# Patient Record
Sex: FEMALE | Race: WHITE | Employment: FULL TIME | URBAN - METROPOLITAN AREA
[De-identification: names, ages, dates, MRNs, and addresses within clinical notes are randomized per-mention and may not be internally consistent; named-entity substitution may affect disease eponyms.]

---

## 2017-05-19 ENCOUNTER — HOSPITAL ENCOUNTER (OUTPATIENT)
Dept: MAMMOGRAPHY | Age: 47
Discharge: HOME | End: 2017-05-19
Attending: INTERNAL MEDICINE
Payer: COMMERCIAL

## 2017-05-19 DIAGNOSIS — Z12.31 SCREENING MAMMOGRAM, ENCOUNTER FOR: ICD-10-CM

## 2017-05-19 PROCEDURE — 77067 SCR MAMMO BI INCL CAD: CPT

## 2017-07-11 ENCOUNTER — OFFICE VISIT (OUTPATIENT)
Dept: INTERNAL MEDICINE CLINIC | Age: 47
End: 2017-07-11

## 2017-07-11 VITALS
HEIGHT: 63 IN | DIASTOLIC BLOOD PRESSURE: 93 MMHG | RESPIRATION RATE: 16 BRPM | WEIGHT: 149 LBS | TEMPERATURE: 98.2 F | SYSTOLIC BLOOD PRESSURE: 146 MMHG | HEART RATE: 101 BPM | OXYGEN SATURATION: 99 % | BODY MASS INDEX: 26.4 KG/M2

## 2017-07-11 DIAGNOSIS — Z00.00 ROUTINE ADULT HEALTH MAINTENANCE: ICD-10-CM

## 2017-07-11 DIAGNOSIS — F42.9 OBSESSIVE-COMPULSIVE DISORDER, UNSPECIFIED TYPE: Chronic | ICD-10-CM

## 2017-07-11 DIAGNOSIS — E11.9 TYPE 2 DIABETES MELLITUS WITHOUT COMPLICATION, WITHOUT LONG-TERM CURRENT USE OF INSULIN (HCC): Primary | Chronic | ICD-10-CM

## 2017-07-11 RX ORDER — GLIMEPIRIDE 4 MG/1
4 TABLET ORAL
COMMUNITY
Start: 2017-05-05 | End: 2017-07-26 | Stop reason: ALTCHOICE

## 2017-07-11 RX ORDER — SERTRALINE HYDROCHLORIDE 100 MG/1
100 TABLET, FILM COATED ORAL DAILY
COMMUNITY
Start: 2017-07-09 | End: 2017-10-06 | Stop reason: SDUPTHER

## 2017-07-11 RX ORDER — METFORMIN HYDROCHLORIDE 1000 MG/1
1000 TABLET ORAL 2 TIMES DAILY
COMMUNITY
Start: 2017-05-05 | End: 2017-09-25 | Stop reason: SDUPTHER

## 2017-07-11 NOTE — PROGRESS NOTES
María Razo is a 52 y.o. female who presents for evaluation of new pt visit. Used to see dr Olu Guallpa, last saw him end of last year. 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 ROS:  Constitutional: negative for fevers, chills, anorexia and weight loss  Eyes:   negative for visual disturbance and irritation  ENT:   negative for tinnitus,sore throat,nasal congestion,ear pain,hoarseness  Respiratory:  negative for cough, hemoptysis, dyspnea,wheezing  CV:   negative for chest pain, palpitations, lower extremity edema  GI:   negative for nausea, vomiting, diarrhea, abdominal pain,melena  Genitourinary: negative for frequency, dysuria and hematuria  Musculoskel: negative for myalgias, arthralgias, back pain, muscle weakness, joint pain  Neurological:  negative for headaches, dizziness, focal weakness, numbness  Psychiatric:     Negative for depression or anxiety      Past Medical History:   Diagnosis Date    Diabetes ([facility] [address]     Menopause 2014    OCD (obsessive compulsive disorder)        Past Surgical History:   Procedure Laterality Date     [address] and 2005    HX CHOLECYSTECTOMY  1995       Family History   Problem Relation Age of Onset    COPD Mother     Hypertension Mother     Cancer Mother     Hypertension Father     Cancer Father     Cancer Sister     Diabetes Maternal Aunt     Diabetes Maternal Uncle        Social History     Social History    Marital status: UNKNOWN     Spouse name: N/A    Number of children: N/A    Years of education: N/A     Occupational History    Not on file.      Social History Main Topics    Smoking status: Former Smoker    Smokeless tobacco: Never Used    Alcohol use Yes      Comment: occassionally    Drug use: No    Sexual activity: Yes     Partners: Male     Other Topics Concern    Not on file     Social History Narrative            Visit Vitals    BP (!) 146/93 (BP 1 Location: Left arm, BP Patient Position: Sitting)    Pulse (!) 101    Temp 98.2 °F (36.8 °C)    Resp 16    Ht 5' 3.25\" (1.607 m)    Wt 149 lb (67.6 kg)    SpO2 99%    BMI 26.19 kg/m2       Physical Examination:   General - Well appearing female  HEENT - PERRL, TM no erythema/opacification, normal nasal turbinates, no oropharyngeal erythema or exudate, MMM  Neck - supple, no bruits, no thyroidomegaly, no lymphadenopathy  Pulm - clear to auscultation bilaterally  Cardio - RRR, normal S1 S2, no murmur  Abd - soft, nontender, no masses, no HSM  Extrem - no edema, +2 distal pulses  Neuro-  No focal deficits, CN intact     Assessment/Plan:    1. Uncontrolled dm, type 2--last a1c 11. Check again with urine micro. On glucophage and amaryl now. Suspect will need insulin (which she did use when pregnant), and will stop amaryl. Consider changing glucophage to xr as well. Check flp. 2.  htn--monitor at home, likely benefit from acei/arb  3.  ocd--continue zoloft  4. Routine adult health--due for pap/pelvic. Check cbc, cmp, flp, tsh. Had eye exam done am [facility] route 1. Also believes had tdap 4 years ago before starting work at [employer].       rtc 3 months        Leela Joseph III, DO

## 2017-07-11 NOTE — PATIENT INSTRUCTIONS

## 2017-07-11 NOTE — MR AVS SNAPSHOT
Visit Information Date & Time Provider Department Dept. Phone Encounter #  
 7/11/2017  3:00 PM Deangelo Castro, [facility] [id number] Follow-up Instructions Return in about 3 months (around 10/11/2017). Upcoming Health Maintenance Date Due DTaP/Tdap/Td series (1 - Tdap) 1/10/1991 PAP AKA CERVICAL CYTOLOGY 1/10/1991 INFLUENZA AGE 9 TO ADULT 8/1/2017 Allergies as of 7/11/2017  Review Complete On: 7/11/2017 By: Stevo Cardona III, DO Severity Noted Reaction Type Reactions Erythromycin  07/11/2017    Other (comments) Severe stomach pains Current Immunizations  Never Reviewed No immunizations on file. Not reviewed this visit You Were Diagnosed With   
  
 Codes Comments Type 2 diabetes mellitus without complication, without long-term current use of insulin (HCC)    -  Primary ICD-10-CM: E11.9 ICD-9-CM: 250.00 Obsessive-compulsive disorder, unspecified type     ICD-10-CM: F42.9 ICD-9-CM: 300.3 Routine adult health maintenance     ICD-10-CM: Z00.00 ICD-9-CM: V70.0 Vitals BP Pulse Temp Resp Height(growth percentile) Weight(growth percentile) (!) 146/93 (BP 1 Location: Left arm, BP Patient Position: Sitting) (!) 101 98.2 °F (36.8 °C) 16 5' 3.25\" (1.607 m) 149 lb (67.6 kg) SpO2 BMI OB Status Smoking Status 99% 26.19 kg/m2 Menopause Former Smoker Vitals History BMI and BSA Data Body Mass Index Body Surface Area  
 26.19 kg/m 2 1.74 m 2 Preferred Pharmacy Pharmacy Name Phone [facility] [place], [place] - [address] [place] [address] [phone] Your Updated Medication List  
  
   
This list is accurate as of: 7/11/17  4:13 PM.  Always use your most recent med list.  
  
  
  
  
 glimepiride 4 mg tablet Commonly known as:  AMARYL Take 4 mg by mouth every morning. metFORMIN 1,000 mg tablet Commonly known as:  GLUCOPHAGE  
 1,000 mg two (2) times a day. Indications: taking once daily  
  
 sertraline 100 mg tablet Commonly known as:  ZOLOFT  
100 mg daily. We Performed the Following CBC WITH AUTOMATED DIFF [98812 CPT(R)] HEMOGLOBIN A1C WITH EAG [31832 CPT(R)] LIPID PANEL [05043 CPT(R)] METABOLIC PANEL, COMPREHENSIVE [42689 CPT(R)] MICROALBUMIN, UR, RAND W/ MICROALBUMIN/CREA RATIO [id number] CPT(R)] TSH 3RD GENERATION [44865 CPT(R)] Follow-up Instructions Return in about 3 months (around 10/11/2017). Patient Instructions Learning About Diabetes Food Guidelines Your Care Instructions Meal planning is important to manage diabetes. It helps keep your blood sugar at a target level (which you set with your doctor). You don't have to eat special foods. You can eat what your family eats, including sweets once in a while. But you do have to pay attention to how often you eat and how much you eat of certain foods. You may want to work with a dietitian or a certified diabetes educator (CDE) to help you plan meals and snacks. A dietitian or CDE can also help you lose weight if that is one of your goals. What should you know about eating carbs? Managing the amount of carbohydrate (carbs) you eat is an important part of healthy meals when you have diabetes. Carbohydrate is found in many foods. · Learn which foods have carbs. And learn the amounts of carbs in different foods. ¨ Bread, cereal, pasta, and rice have about 15 grams of carbs in a serving. A serving is 1 slice of bread (1 ounce), ½ cup of cooked cereal, or 1/3 cup of cooked pasta or rice. ¨ Fruits have 15 grams of carbs in a serving. A serving is 1 small fresh fruit, such as an apple or orange; ½ of a banana; ½ cup of cooked or canned fruit; ½ cup of fruit juice; 1 cup of melon or raspberries; or 2 tablespoons of dried fruit. ¨ Milk and no-sugar-added yogurt have 15 grams of carbs in a serving.  A serving is 1 cup of milk or 2/3 cup of no-sugar-added yogurt. ¨ Starchy vegetables have 15 grams of carbs in a serving. A serving is ½ cup of mashed potatoes or sweet potato; 1 cup winter squash; ½ of a small baked potato; ½ cup of cooked beans; or ½ cup cooked corn or green peas. · Learn how much carbs to eat each day and at each meal. A dietitian or CDE can teach you how to keep track of the amount of carbs you eat. This is called carbohydrate counting. · If you are not sure how to count carbohydrate grams, use the Plate Method to plan meals. It is a good, quick way to make sure that you have a balanced meal. It also helps you spread carbs throughout the day. ¨ Divide your plate by types of foods. Put non-starchy vegetables on half the plate, meat or other protein food on one-quarter of the plate, and a grain or starchy vegetable in the final quarter of the plate. To this you can add a small piece of fruit and 1 cup of milk or yogurt, depending on how many carbs you are supposed to eat at a meal. 
· Try to eat about the same amount of carbs at each meal. Do not \"save up\" your daily allowance of carbs to eat at one meal. 
· Proteins have very little or no carbs per serving. Examples of proteins are beef, chicken, turkey, fish, eggs, tofu, cheese, cottage cheese, and peanut butter. A serving size of meat is 3 ounces, which is about the size of a deck of cards. Examples of meat substitute serving sizes (equal to 1 ounce of meat) are 1/4 cup of cottage cheese, 1 egg, 1 tablespoon of peanut butter, and ½ cup of tofu. How can you eat out and still eat healthy? · Learn to estimate the serving sizes of foods that have carbohydrate. If you measure food at home, it will be easier to estimate the amount in a serving of restaurant food. · If the meal you order has too much carbohydrate (such as potatoes, corn, or baked beans), ask to have a low-carbohydrate food instead. Ask for a salad or green vegetables. · If you use insulin, check your blood sugar before and after eating out to help you plan how much to eat in the future. · If you eat more carbohydrate at a meal than you had planned, take a walk or do other exercise. This will help lower your blood sugar. What else should you know? · Limit saturated fat, such as the fat from meat and dairy products. This is a healthy choice because people who have diabetes are at higher risk of heart disease. So choose lean cuts of meat and nonfat or low-fat dairy products. Use olive or canola oil instead of butter or shortening when cooking. · Don't skip meals. Your blood sugar may drop too low if you skip meals and take insulin or certain medicines for diabetes. · Check with your doctor before you drink alcohol. Alcohol can cause your blood sugar to drop too low. Alcohol can also cause a bad reaction if you take certain diabetes medicines. Follow-up care is a key part of your treatment and safety. Be sure to make and go to all appointments, and call your doctor if you are having problems. It's also a good idea to know your test results and keep a list of the medicines you take. Where can you learn more? Go to http://delon-frances.info/. Enter S375 in the search box to learn more about \"Learning About Diabetes Food Guidelines. \" Current as of: March 13, 2017 Content Version: 11.3 © 4579-1980 Healthwise, Incorporated. Care instructions adapted under license by [employer] (which disclaims liability or warranty for this information). If you have questions about a medical condition or this instruction, always ask your healthcare professional. [place] [address] any warranty or liability for your use of this information. Come back for fasting labs, lab opens 8 am,mon-fri. No appt needed. Make appt with gyn for pap/pelvic. Look into when you last had your tdap booster. Introducing [address]! [facility] introduces [employer] patient portal. Now you can access parts of your medical record, email your doctor's office, and request medication refills online. 1. In your internet browser, go to [email or web address] 2. Click on the First Time User? Click Here link in the Sign In box. You will see the New Member Sign Up page. 3. Enter your [employer] Access Code exactly as it appears below. You will not need to use this code after youve completed the sign-up process. If you do not sign up before the expiration date, you must request a new code. · [employer] Access Code: [email or web address] Expires: 8/11/2017 12:11 PM 
 
4. Enter the last four digits of your Social Security Number (xxxx) and Date of Birth (mm/dd/yyyy) as indicated and click Submit. You will be taken to the next sign-up page. 5. Create a [employer] ID. This will be your [employer] login ID and cannot be changed, so think of one that is secure and easy to remember. 6. Create a [employer] password. You can change your password at any time. 7. Enter your Password Reset Question and Answer. This can be used at a later time if you forget your password. 8. Enter your e-mail address. You will receive e-mail notification when new information is available in [address] 9. Click Sign Up. You can now view and download portions of your medical record. 10. Click the Download Summary menu link to download a portable copy of your medical information. If you have questions, please visit the Frequently Asked Questions section of the [employer] website. Remember, [employer] is NOT to be used for urgent needs. For medical emergencies, dial 911. Now available from your iPhone and Android! Please provide this summary of care documentation to your next provider. Your primary care clinician is listed as Lauren Pete If you have any questions after today's visit, please call [phone].

## 2017-07-11 NOTE — PROGRESS NOTES
Reviewed record in preparation for visit and have obtained necessary documentation. Identified pt with two pt identifiers(name and ). Chief Complaint   Patient presents with   [address] Maintenance Due   Topic Date Due    DTaP/Tdap/Td series (1 - Tdap) 01/10/1991    PAP AKA CERVICAL CYTOLOGY  01/10/1991       Ms. Yusuf Thompson has a reminder for a \"due or due soon\" health maintenance. I have asked that she discuss health maintenance topic(s) due with Her  primary care provider. Coordination of Care Questionnaire:  :     1) Have you been to an emergency room, urgent care clinic since your last visit? no   Hospitalized since your last visit? no             2) Have you seen or consulted any other health care providers outside of [facility] since your last visit? no  (Include any pap smears or colon screenings in this section.)    3) Do you have an Advance Directive on file? no    4) Are you interested in receiving information on Advance Directives? NO    Patient is accompanied by self I have received verbal consent from Katherine Pablo to discuss any/all medical information while they are present in the room.

## 2017-07-21 ENCOUNTER — APPOINTMENT (OUTPATIENT)
Dept: INTERNAL MEDICINE CLINIC | Age: 47
End: 2017-07-21

## 2017-07-22 LAB
ALBUMIN SERPL-MCNC: 4.3 G/DL (ref 3.5–5.5)
ALBUMIN/CREAT UR: 37.4 MG/G CREAT (ref 0–30)
ALBUMIN/GLOB SERPL: 1.8 {RATIO} (ref 1.2–2.2)
ALP SERPL-CCNC: 99 IU/L (ref 39–117)
ALT SERPL-CCNC: 40 IU/L (ref 0–32)
AST SERPL-CCNC: 28 IU/L (ref 0–40)
BASOPHILS # BLD AUTO: 0 X10E3/UL (ref 0–0.2)
BASOPHILS NFR BLD AUTO: 1 %
BILIRUB SERPL-MCNC: 0.3 MG/DL (ref 0–1.2)
BUN SERPL-MCNC: 12 MG/DL (ref 6–24)
BUN/CREAT SERPL: 16 (ref 9–23)
CALCIUM SERPL-MCNC: 9.2 MG/DL (ref 8.7–10.2)
CHLORIDE SERPL-SCNC: 100 MMOL/L (ref 96–106)
CHOLEST SERPL-MCNC: 176 MG/DL (ref 100–199)
CO2 SERPL-SCNC: 24 MMOL/L (ref 18–29)
CREAT SERPL-MCNC: 0.75 MG/DL (ref 0.57–1)
CREAT UR-MCNC: 146.2 MG/DL
EOSINOPHIL # BLD AUTO: 0.1 X10E3/UL (ref 0–0.4)
EOSINOPHIL NFR BLD AUTO: 1 %
ERYTHROCYTE [DISTWIDTH] IN BLOOD BY AUTOMATED COUNT: 13.2 % (ref 12.3–15.4)
EST. AVERAGE GLUCOSE BLD GHB EST-MCNC: 278 MG/DL
GLOBULIN SER CALC-MCNC: 2.4 G/DL (ref 1.5–4.5)
GLUCOSE SERPL-MCNC: 214 MG/DL (ref 65–99)
HBA1C MFR BLD: 11.3 % (ref 4.8–5.6)
HCT VFR BLD AUTO: 39.7 % (ref 34–46.6)
HDLC SERPL-MCNC: 44 MG/DL
HGB BLD-MCNC: 12.8 G/DL (ref 11.1–15.9)
IMM GRANULOCYTES # BLD: 0 X10E3/UL (ref 0–0.1)
IMM GRANULOCYTES NFR BLD: 0 %
LDLC SERPL CALC-MCNC: 107 MG/DL (ref 0–99)
LYMPHOCYTES # BLD AUTO: 2.4 X10E3/UL (ref 0.7–3.1)
LYMPHOCYTES NFR BLD AUTO: 33 %
MCH RBC QN AUTO: 27.1 PG (ref 26.6–33)
MCHC RBC AUTO-ENTMCNC: 32.2 G/DL (ref 31.5–35.7)
MCV RBC AUTO: 84 FL (ref 79–97)
MICROALBUMIN UR-MCNC: 54.7 UG/ML
MONOCYTES # BLD AUTO: 0.3 X10E3/UL (ref 0.1–0.9)
MONOCYTES NFR BLD AUTO: 4 %
NEUTROPHILS # BLD AUTO: 4.4 X10E3/UL (ref 1.4–7)
NEUTROPHILS NFR BLD AUTO: 61 %
PLATELET # BLD AUTO: 229 X10E3/UL (ref 150–379)
POTASSIUM SERPL-SCNC: 4.2 MMOL/L (ref 3.5–5.2)
PROT SERPL-MCNC: 6.7 G/DL (ref 6–8.5)
RBC # BLD AUTO: 4.73 X10E6/UL (ref 3.77–5.28)
SODIUM SERPL-SCNC: 142 MMOL/L (ref 134–144)
TRIGL SERPL-MCNC: 124 MG/DL (ref 0–149)
TSH SERPL DL<=0.005 MIU/L-ACNC: 0.95 UIU/ML (ref 0.45–4.5)
VLDLC SERPL CALC-MCNC: 25 MG/DL (ref 5–40)
WBC # BLD AUTO: 7.2 X10E3/UL (ref 3.4–10.8)

## 2017-07-23 PROBLEM — E78.5 HYPERLIPIDEMIA ASSOCIATED WITH TYPE 2 DIABETES MELLITUS (HCC): Chronic | Status: ACTIVE | Noted: 2017-07-23

## 2017-07-23 PROBLEM — E11.69 HYPERLIPIDEMIA ASSOCIATED WITH TYPE 2 DIABETES MELLITUS (HCC): Chronic | Status: ACTIVE | Noted: 2017-07-23

## 2017-07-23 RX ORDER — ATORVASTATIN CALCIUM 20 MG/1
20 TABLET, FILM COATED ORAL DAILY
Qty: 30 TAB | Refills: 9 | Status: SHIPPED | OUTPATIENT
Start: 2017-07-23 | End: 2018-07-27 | Stop reason: SDUPTHER

## 2017-07-23 RX ORDER — INSULIN DEGLUDEC 200 U/ML
20 INJECTION, SOLUTION SUBCUTANEOUS DAILY
Qty: 4 ADJUSTABLE DOSE PRE-FILLED PEN SYRINGE | Refills: 9 | Status: SHIPPED | OUTPATIENT
Start: 2017-07-23 | End: 2018-02-16 | Stop reason: SDUPTHER

## 2017-07-23 NOTE — PROGRESS NOTES
Needs to make appt to discuss starting insulin. Would start tresiba 20 units each morning with breakfast.  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 Would stop amaryl, continue with glucophage. Cholesterol levels also too high for pt with diabetes. rx sent in for lipitor.

## 2017-07-24 ENCOUNTER — PATIENT OUTREACH (OUTPATIENT)
Dept: INTERNAL MEDICINE CLINIC | Age: 47
End: 2017-07-24

## 2017-07-24 ENCOUNTER — TELEPHONE (OUTPATIENT)
Dept: INTERNAL MEDICINE CLINIC | Age: 47
End: 2017-07-24

## 2017-07-24 NOTE — PROGRESS NOTES
Call completed to patient, two identifiers verified. Lab results and recommendations reviewed. Patient verbalized an understanding and agrees with recommendations for starting medication and insulin. Patient offered and declined an office visit. 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 She is requesting a phone call from Abby Woods Diabetic Educator to discuss titration is possible. The patient lives 40 minutes away and reports using insulin before during pregnancy. Patient advised request would be submitted to DO for review and approval. Verbal with read back orders received for insulin titration. Per Dr. Prosper Garcia Patient is to start using 20 units of insulin degludec (TRESIBA FLEXTOUCH U-200) 200 unit/mL (3 mL) inpn and increase the dosage every Tuesday (IF she starts on Tuesday) by 4 units until her fasting blood glucose is 150 or less.        Was asked to see pt for T2DM. See result below. Lab Results   Component Value Date/Time    Hemoglobin A1c 11.3 07/21/2017 08:34 AM     Pt stated she picked up the insulin and Lipitor. Pt will stop Amaryl and continue metformin. Pt will take insulin in the morning, and check blood sugar fasting and record. Pt will bring log book to her appt on 7/26/17 at 3 PM.     Future Appointments:  Future Appointments  Date Time Provider Jose M Ivan   7/26/2017 3:00 PM Fox Lentz        Last Appointment My Department:  7/11/2017    This note will not be viewable in [address]

## 2017-07-24 NOTE — TELEPHONE ENCOUNTER
----- Message from Bryson Rodriguez III DO sent at 7/23/2017  1:13 PM EDT -----  Needs to make appt to discuss starting insulin. Would start tresiba 20 units each morning with breakfast.  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 Would stop amaryl, continue with glucophage. Cholesterol levels also too high for pt with diabetes. rx sent in for lipitor.

## 2017-07-24 NOTE — PROGRESS NOTES
Call completed to patient, two identifiers verified. Lab results and recommendations reviewed. Patient verbalized an understanding and agrees with recommendations for starting medication and insulin. Patient offered and declined an office visit. 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 She is requesting a phone call from Gulshan Quinteros Diabetic Educator to discuss titration is possible. The patient lives 40 minutes away and reports using insulin before during pregnancy.  Patient advised request would be submitted to DO for review and approval.

## 2017-07-24 NOTE — TELEPHONE ENCOUNTER
Call completed to patient, two identifiers verified. Lab results and recommendations reviewed. Patient verbalized an understanding and agrees with recommendations for starting medication and insulin. Patient offered and declined an office visit. 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 She is requesting a phone call from Marla Cueto Diabetic Educator to discuss titration is possible. The patient lives [place] minutes away and reports using insulin before during pregnancy. Patient advised request would be submitted to DO for review and approval. Verbal with read back orders received for insulin titration. Per Dr. Jean Pierre Cuevas Patient is to start using 20 units of insulin degludec (TRESIBA FLEXTOUCH U-200) 200 unit/mL (3 mL) inpn and increase the dosage every Tuesday (IF she starts on Tuesday) by 4 units until her fasting blood glucose is 150 or less.

## 2017-07-26 ENCOUNTER — OFFICE VISIT (OUTPATIENT)
Dept: INTERNAL MEDICINE CLINIC | Age: 47
End: 2017-07-26

## 2017-07-26 ENCOUNTER — PATIENT OUTREACH (OUTPATIENT)
Dept: INTERNAL MEDICINE CLINIC | Age: 47
End: 2017-07-26

## 2017-07-26 DIAGNOSIS — E11.9 TYPE 2 DIABETES MELLITUS WITHOUT COMPLICATION, WITHOUT LONG-TERM CURRENT USE OF INSULIN (HCC): Primary | Chronic | ICD-10-CM

## 2017-07-26 NOTE — PROGRESS NOTES
CC:  Diabetes management    S/O: Dina Peck is a 52 y.o. female referred by Dr. Sanjana Avila for diabetes management. Pt just started insulin today, but took it 12 years ago when she was pregnant. Pt has been taking oral medications only. Current diabetes regimen consists of the following: Tresiba 20 units daily with breakfast, increase 4 units every Tuesday until fasting blood sugar is 150 or less. Pt injects in upper outer quadrant of thigh; metformin 1000 mg twice daily with meals; Lipitor 20 mg every evening. Patient denies the following signs/symptoms of diabetes: polyuria, polydipsia, polyphagia, blurred vision, numbness/tingling in hands/feet. Patient denies recent hypoglycemic episodes. Patient is motivated 5/10 to control diabetes. It is not a 3 or 4 because pt stated she needs to get control    Patient checks blood sugars one time per day and readings run 220mg/dL. That was this morning. Pt has not been checking her blood sugars on a regular basis. Most recent A1C was 11.3%. A year ago a1c was ~12 %. Lab Results   Component Value Date/Time    Hemoglobin A1c 11.3 07/21/2017 08:34 AM     Pt loves sweets. Pt eats tossed salads with cucumber and tomato and green peppers, but does not like any other veggies. Pt cooks for a family and needs to prepare meals that the whole family can eat. Pt does not have a portion control problem. Her BMI is 26. 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   Patient denies adverse effects from their medications. Past Medical History:   Diagnosis Date    Diabetes ([address]     Menopause     OCD (obsessive compulsive disorder)      Past Surgical History:   Procedure Laterality Date    HX  SECTION       and     HX CHOLECYSTECTOMY       Family History   Problem Relation Age of Onset   [place] COPD Mother     Hypertension Mother     Cancer Mother     Hypertension Father     Cancer Father     Cancer Sister     Diabetes Maternal Aunt     Diabetes Maternal Uncle      Social History     Social History    Marital status:      Spouse name: N/A    Number of children: N/A    Years of education: N/A     Social History Main Topics    Smoking status: Former Smoker    Smokeless tobacco: Never Used    Alcohol use Yes      Comment: occassionally    Drug use: No    Sexual activity: Yes     Partners: Male     Other Topics Concern    Not on file     Social History Narrative     Allergies   Allergen Reactions    Erythromycin Other (comments)     Severe stomach pains     Current Outpatient Prescriptions   Medication Sig    atorvastatin (LIPITOR) 20 mg tablet Take 1 Tab by mouth daily.  insulin degludec (TRESIBA FLEXTOUCH U-200) 200 unit/mL (3 mL) inpn 20 Units by SubCUTAneous route daily.  metFORMIN (GLUCOPHAGE) 1,000 mg tablet 1,000 mg two (2) times a day. Indications: taking once daily    sertraline (ZOLOFT) 100 mg tablet 100 mg daily. No current facility-administered medications for this visit. There were no vitals taken for this visit.     Data reviewed:  Lab Results   Component Value Date/Time    Hemoglobin A1c 11.3 2017 08:34 AM     Lab Results   Component Value Date/Time    Cholesterol, total 176 2017 08:34 AM    HDL Cholesterol 44 2017 08:34 AM    LDL, calculated 107 2017 08:34 AM    VLDL, calculated 25 2017 08:34 AM    Triglyceride 124 2017 08:34 AM     Lab Results   Component Value Date/Time    ALT (SGPT) 40 2017 08:34 AM AST (SGOT) 28 07/21/2017 08:34 AM    Alk. phosphatase 99 07/21/2017 08:34 AM    Bilirubin, total 0.3 07/21/2017 08:34 AM     Lab Results   Component Value Date/Time    Creatinine 0.75 07/21/2017 08:34 AM     Lab Results   Component Value Date/Time    Microalb/Creat ratio (ug/mg creat.) 37.4 07/21/2017 08:34 AM       Screenings:  Last eye exam was - pt will schedule  Last foot exam was - next appt    Assessment/Plan:     1. Diabetes:  Controlled/Uncontrolled (goal A1C at least <7%/<8% due to older age and increased risk of hypoglycemia). Blood glucose readings: are high      My Chart glucose flow sheet ordered today for pt. Pt sent My Chart message with website for My Fitness Pal      A. Medications:  Stressed importance of medication adherence on overall diabetes control as well as with preventing complications of diabetes. Reviewed symptoms of hypoglycemia and how to treat. Instructed patient to continue to monitor blood sugars every morning fasting and call the office if has a low blood sugar. B. Diet/lifestyle:  Reinforced importance of regular activity/exercise to help improve insulin resistance and reviewed food labels/carbohydrates/general carb guidelines for meals (30-45) and snacks (goal of 15g). Patient education materials were provided and reviewed with the patient. Daily Diabetes Meal Planning Guide by [employer]  Living Well With Diabetes  Glucose Log Book   Get Smart about counting Carbs by [facility]  Diabetes Health Monitor [place]  Diabetes Health Monitor Special Supplement  Web MD Diabetes [place]    1..  Hypertension:  Blood pressure is/is not at goal of < 140/90 mm Hg per the ADA guidelines. Emphasized the importance of regular physical activity, restricting salt in diet and weight maintenance/loss on overall blood pressure control. 2..  Hyperlipidemia:  LDL currently is/is  <100 mg/dL.   Current lipid treatment guidelines recommend at least moderate-intensity statin doses to decrease ASCVD risk. 3.  Medication reconciliation was completed during the visit. Medications Discontinued During This Encounter   Medication Reason    glimepiride (AMARYL) 4 mg tablet Therapy Completed       Patient verbalized understanding of the information presented and all of the patients questions were answered. AVS was handed to the patient and information reviewed. Patient advised to call the office with any additional questions or concerns. Follow-up: 10/4/17. Notification of recommendations will be sent to Dr. Ziyad Izaguirre DO for review. Thank you for the consult,  Audrey Clemens RN    Note routed to Dr. Claudeen Hung.

## 2017-07-26 NOTE — PATIENT INSTRUCTIONS
Goals:    1) incorporate whole wheat and grains into family meals starting next week    2) sweets- choose sugar free alternatives starting now

## 2017-07-26 NOTE — PROGRESS NOTES
CC:  Diabetes management    S/O: Lia Narayanan is a 52 y.o. female referred by Dr. Franchesca Ford for diabetes management. Pt just started insulin today, but took it 12 years ago when she was pregnant. Pt has been taking oral medications only. Current diabetes regimen consists of the following: Tresiba 20 units daily with breakfast, increase 4 units every Tuesday until fasting blood sugar is 150 or less. Pt injects in upper outer quadrant of thigh; metformin 1000 mg twice daily with meals; Lipitor 20 mg every evening. Patient denies the following signs/symptoms of diabetes: polyuria, polydipsia, polyphagia, blurred vision, numbness/tingling in hands/feet. Patient denies recent hypoglycemic episodes. Patient is motivated 5/10 to control diabetes. It is not a 3 or 4 because pt stated she needs to get control    Patient checks blood sugars one time per day and readings run 220mg/dL. That was this morning. Pt has not been checking her blood sugars on a regular basis. Most recent A1C was 11.3%. A year ago a1c was ~12 %. Lab Results   Component Value Date/Time    Hemoglobin A1c 11.3 07/21/2017 08:34 AM     Pt loves sweets. Pt eats tossed salads with cucumber and tomato and green peppers, but does not like any other veggies. Pt cooks for a family and needs to prepare meals that the whole family can eat. Pt does not have a portion control problem. Her BMI is 26. 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   Patient denies adverse effects from their medications. Past Medical History:   Diagnosis Date    Diabetes ([address]     Menopause     OCD (obsessive compulsive disorder)      Past Surgical History:   Procedure Laterality Date    HX  SECTION       and     HX CHOLECYSTECTOMY       Family History   Problem Relation Age of Onset   [employer] COPD Mother     Hypertension Mother     Cancer Mother     Hypertension Father     Cancer Father     Cancer Sister     Diabetes Maternal Aunt     Diabetes Maternal Uncle      Social History     Social History    Marital status:      Spouse name: N/A    Number of children: N/A    Years of education: N/A     Social History Main Topics    Smoking status: Former Smoker    Smokeless tobacco: Never Used    Alcohol use Yes      Comment: occassionally    Drug use: No    Sexual activity: Yes     Partners: Male     Other Topics Concern    Not on file     Social History Narrative     Allergies   Allergen Reactions    Erythromycin Other (comments)     Severe stomach pains     Current Outpatient Prescriptions   Medication Sig    atorvastatin (LIPITOR) 20 mg tablet Take 1 Tab by mouth daily.  insulin degludec (TRESIBA FLEXTOUCH U-200) 200 unit/mL (3 mL) inpn 20 Units by SubCUTAneous route daily.  metFORMIN (GLUCOPHAGE) 1,000 mg tablet 1,000 mg two (2) times a day. Indications: taking once daily    sertraline (ZOLOFT) 100 mg tablet 100 mg daily. No current facility-administered medications for this visit. There were no vitals taken for this visit.     Data reviewed:  Lab Results   Component Value Date/Time    Hemoglobin A1c 11.3 2017 08:34 AM     Lab Results   Component Value Date/Time    Cholesterol, total 176 2017 08:34 AM    HDL Cholesterol 44 2017 08:34 AM    LDL, calculated 107 2017 08:34 AM    VLDL, calculated 25 2017 08:34 AM    Triglyceride 124 2017 08:34 AM     Lab Results   Component Value Date/Time    ALT (SGPT) 40 2017 08:34 AM AST (SGOT) 28 07/21/2017 08:34 AM    Alk. phosphatase 99 07/21/2017 08:34 AM    Bilirubin, total 0.3 07/21/2017 08:34 AM     Lab Results   Component Value Date/Time    Creatinine 0.75 07/21/2017 08:34 AM     Lab Results   Component Value Date/Time    Microalb/Creat ratio (ug/mg creat.) 37.4 07/21/2017 08:34 AM       Screenings:  Last eye exam was - pt will schedule  Last foot exam was - next appt    Assessment/Plan:     1. Diabetes:  Controlled/Uncontrolled (goal A1C at least <7%/<8% due to older age and increased risk of hypoglycemia). Blood glucose readings: are high      My Chart glucose flow sheet ordered today for pt. Pt sent My Chart message with website for My Fitness Pal      A. Medications:  Stressed importance of medication adherence on overall diabetes control as well as with preventing complications of diabetes. Reviewed symptoms of hypoglycemia and how to treat. Instructed patient to continue to monitor blood sugars every morning fasting and call the office if has a low blood sugar. B. Diet/lifestyle:  Reinforced importance of regular activity/exercise to help improve insulin resistance and reviewed food labels/carbohydrates/general carb guidelines for meals (30-45) and snacks (goal of 15g). Patient education materials were provided and reviewed with the patient. Daily Diabetes Meal Planning Guide by [employer]  Living Well With Diabetes  Glucose Log Book   Get Smart about counting Carbs by [facility]  Diabetes Health Monitor [place]  Diabetes Health Monitor Special Supplement  Web MD Diabetes [place]    1..  Hypertension:  Blood pressure is/is not at goal of < 140/90 mm Hg per the ADA guidelines. Emphasized the importance of regular physical activity, restricting salt in diet and weight maintenance/loss on overall blood pressure control. 2..  Hyperlipidemia:  LDL currently is/is  <100 mg/dL.   Current lipid treatment guidelines recommend at least moderate-intensity statin doses to decrease ASCVD risk. 3.  Medication reconciliation was completed during the visit. Medications Discontinued During This Encounter   Medication Reason    glimepiride (AMARYL) 4 mg tablet Therapy Completed     Goals:    1) incorporate whole wheat and grains into family meals starting next week    2) sweets- choose sugar free alternatives starting now    Patient verbalized understanding of the information presented and all of the patients questions were answered. AVS was handed to the patient and information reviewed. Patient advised to call the office with any additional questions or concerns. Follow-up: 10/4/17. Notification of recommendations will be sent to Dr. Umesh Keys DO for review. Thank you for the consult,  Ofelia Morales RN    Note routed to Dr. Sanjana Avila. This note will not be viewable in [address]

## 2017-09-25 ENCOUNTER — TELEPHONE (OUTPATIENT)
Dept: INTERNAL MEDICINE CLINIC | Age: 47
End: 2017-09-25

## 2017-09-25 RX ORDER — METFORMIN HYDROCHLORIDE 1000 MG/1
1000 TABLET ORAL 2 TIMES DAILY
Qty: 180 TAB | Refills: 3 | Status: SHIPPED | OUTPATIENT
Start: 2017-09-25 | End: 2018-10-12 | Stop reason: SDUPTHER

## 2017-10-06 RX ORDER — SERTRALINE HYDROCHLORIDE 100 MG/1
100 TABLET, FILM COATED ORAL DAILY
Qty: 90 TAB | Refills: 3 | Status: SHIPPED | OUTPATIENT
Start: 2017-10-06 | End: 2018-10-08 | Stop reason: SDUPTHER

## 2017-10-06 NOTE — TELEPHONE ENCOUNTER
Please follow up with this patient at [phone] (home) [phone] (work) when this has been completed. Thanks!

## 2017-10-30 ENCOUNTER — TELEPHONE (OUTPATIENT)
Dept: INTERNAL MEDICINE CLINIC | Age: 47
End: 2017-10-30

## 2017-10-30 NOTE — TELEPHONE ENCOUNTER
#[phone] pt needs to know if you have samples of her insulin, Alicia Burgess? Please call pt as soon as possible as she is almost out of insulin.

## 2017-10-30 NOTE — TELEPHONE ENCOUNTER
Spoke with patient after 2 patient identifiers being note and advised that we do not have any samples at this time. Patient expressed understanding and has no further questions at this time.

## 2017-11-17 ENCOUNTER — PATIENT OUTREACH (OUTPATIENT)
Dept: INTERNAL MEDICINE CLINIC | Age: 47
End: 2017-11-17

## 2017-11-17 NOTE — PROGRESS NOTES
Patient identified on Diabetic Bundle Registry for A1c >9%. Left voice message for Patient asking for a return call regarding scheduling an appointment with the Interprofessional Diabetes Team. This educator has also met with pt in the past. Pt was due for f/u with Dr. Casper Deng last month. Get In Touch letter mailed. Lab Results   Component Value Date/Time    Hemoglobin A1c 11.3 07/21/2017 08:34 AM     Future Appointments:  No future appointments.      Last Appointment My Department:  7/26/2017

## 2018-02-16 ENCOUNTER — TELEPHONE (OUTPATIENT)
Dept: INTERNAL MEDICINE CLINIC | Age: 48
End: 2018-02-16

## 2018-02-16 DIAGNOSIS — E11.9 TYPE 2 DIABETES MELLITUS WITHOUT COMPLICATION, WITHOUT LONG-TERM CURRENT USE OF INSULIN (HCC): Primary | Chronic | ICD-10-CM

## 2018-02-16 RX ORDER — INSULIN DEGLUDEC 200 U/ML
20 INJECTION, SOLUTION SUBCUTANEOUS DAILY
Qty: 1 PEN | Refills: 0 | Status: SHIPPED | COMMUNITY
Start: 2018-02-16 | End: 2018-02-16 | Stop reason: DRUGHIGH

## 2018-02-16 RX ORDER — INSULIN DEGLUDEC 100 U/ML
INJECTION, SOLUTION SUBCUTANEOUS
Qty: 1 PEN | Refills: 0 | Status: SHIPPED | COMMUNITY
Start: 2018-02-16 | End: 2018-07-05 | Stop reason: SDUPTHER

## 2018-02-16 NOTE — TELEPHONE ENCOUNTER
Spoke with patient after 2 patient identifiers being note and advised that medication is in office ready for . Patient expressed understanding and has no further questions at this time.

## 2018-02-16 NOTE — TELEPHONE ENCOUNTER
Patient's daughter, [facility] (Rosalie Alvarez confirmed), presented to the office to  insulin sample. Verified medication & noted we only have Tresiba U-100 on hand. U-200 is what patient has at home & what is ordered by Dr. Jason Carreon. I confirmed with another physician on behalf of Dr. Jason Carreon if her dosing would need to change based on concentration difference. Per Dr. Romero Torres dosing may stay the same with this medication. Daughter & patient advised of this & verbalize understanding. Medication list updated to reflect accurate sample dispensed.

## 2018-02-20 ENCOUNTER — OFFICE VISIT (OUTPATIENT)
Dept: INTERNAL MEDICINE CLINIC | Age: 48
End: 2018-02-20

## 2018-02-20 VITALS
RESPIRATION RATE: 16 BRPM | HEART RATE: 94 BPM | OXYGEN SATURATION: 99 % | TEMPERATURE: 98.1 F | DIASTOLIC BLOOD PRESSURE: 89 MMHG | WEIGHT: 150 LBS | BODY MASS INDEX: 26.58 KG/M2 | HEIGHT: 63 IN | SYSTOLIC BLOOD PRESSURE: 134 MMHG

## 2018-02-20 DIAGNOSIS — E11.9 TYPE 2 DIABETES MELLITUS WITHOUT COMPLICATION, WITHOUT LONG-TERM CURRENT USE OF INSULIN (HCC): Primary | Chronic | ICD-10-CM

## 2018-02-20 DIAGNOSIS — E11.21 TYPE 2 DIABETES WITH NEPHROPATHY (HCC): Chronic | ICD-10-CM

## 2018-02-20 DIAGNOSIS — E11.69 HYPERLIPIDEMIA ASSOCIATED WITH TYPE 2 DIABETES MELLITUS (HCC): Chronic | ICD-10-CM

## 2018-02-20 DIAGNOSIS — F42.9 OBSESSIVE-COMPULSIVE DISORDER, UNSPECIFIED TYPE: Chronic | ICD-10-CM

## 2018-02-20 DIAGNOSIS — E78.5 HYPERLIPIDEMIA ASSOCIATED WITH TYPE 2 DIABETES MELLITUS (HCC): Chronic | ICD-10-CM

## 2018-02-20 LAB — HBA1C MFR BLD HPLC: 10.7 %

## 2018-02-20 NOTE — PATIENT INSTRUCTIONS
Diabetes Foot Health: Care Instructions  Your Care Instructions    When you have diabetes, your feet need extra care and attention. Diabetes can damage the nerve endings and blood vessels in your feet, making you less likely to notice when your feet are injured. Diabetes also limits your body's ability to fight infection and get blood to areas that need it. If you get a minor foot injury, it could become an ulcer or a serious infection. With good foot care, you can prevent most of these problems. Caring for your feet can be quick and easy. Most of the care can be done when you are bathing or getting ready for bed. Follow-up care is a key part of your treatment and safety. Be sure to make and go to all appointments, and call your doctor if you are having problems. It's also a good idea to know your test results and keep a list of the medicines you take. How can you care for yourself at home? · Keep your blood sugar close to normal by watching what and how much you eat, monitoring blood sugar, taking medicines if prescribed, and getting regular exercise. · Do not smoke. Smoking affects blood flow and can make foot problems worse. If you need help quitting, talk to your doctor about stop-smoking programs and medicines. These can increase your chances of quitting for good. · Eat a diet that is low in fats. High fat intake can cause fat to build up in your blood vessels and decrease blood flow. · Inspect your feet daily for blisters, cuts, cracks, or sores. If you cannot see well, use a mirror or have someone help you. · Take care of your feet:  [facility] your feet every day. Use warm (not hot) water. Check the water temperature with your wrists or other part of your body, not your feet. ¨ Dry your feet well. Pat them dry. Do not rub the skin on your feet too hard. Dry well between your toes. If the skin on your feet stays moist, bacteria or a fungus can grow, which can lead to infection.   ¨ Keep your skin soft. Use moisturizing skin cream to keep the skin on your feet soft and prevent calluses and cracks. But do not put the cream between your toes, and stop using any cream that causes a rash. ¨ Clean underneath your toenails carefully. Do not use a sharp object to clean underneath your toenails. Use the blunt end of a nail file or other rounded tool. ¨ Trim and file your toenails straight across to prevent ingrown toenails. Use a nail clipper, not scissors. Use an emery board to smooth the edges. · Change socks daily. Socks without seams are best, because seams often rub the feet. You can find socks for people with diabetes from specialty catalogs. · Look inside your shoes every day for things like gravel or torn linings, which could cause blisters or sores. · Buy shoes that fit well:  ¨ Look for shoes that have plenty of space around the toes. This helps prevent bunions and blisters. ¨ Try on shoes while wearing the kind of socks you will usually wear with the shoes. ¨ Avoid plastic shoes. They may rub your feet and cause blisters. Good shoes should be made of materials that are flexible and breathable, such as leather or cloth. ¨ Break in new shoes slowly by wearing them for no more than an hour a day for several days. Take extra time to check your feet for red areas, blisters, or other problems after you wear new shoes. · Do not go barefoot. Do not wear sandals, and do not wear shoes with very thin soles. Thin soles are easy to puncture. They also do not protect your feet from hot pavement or cold weather. · Have your doctor check your feet during each visit. If you have a foot problem, see your doctor. Do not try to treat an early foot problem at home. Home remedies or treatments that you can buy without a prescription (such as corn removers) can be harmful. · Always get early treatment for foot problems. A minor irritation can lead to a major problem if not properly cared for early.   When should you call for help? Call your doctor now or seek immediate medical care if:  ? · You have a foot sore, an ulcer or break in the skin that is not healing after 4 days, bleeding corns or calluses, or an ingrown toenail. ? · You have blue or black areas, which can mean bruising or blood flow problems. ? · You have peeling skin or tiny blisters between your toes or cracking or oozing of the skin. ? · You have a fever for more than 24 hours and a foot sore. ? · You have new numbness or tingling in your feet that does not go away after you move your feet or change positions. ? · You have unexplained or unusual swelling of the foot or ankle. ? Watch closely for changes in your health, and be sure to contact your doctor if:  ? · You cannot do proper foot care. Where can you learn more? Go to http://delon-frances.info/. Enter A739 in the search box to learn more about \"Diabetes Foot Health: Care Instructions. \"  Current as of: March 13, 2017  Content Version: 11.4  © 2635-9032 [employer]. Care instructions adapted under license by [employer] (which disclaims liability or warranty for this information). If you have questions about a medical condition or this instruction, always ask your healthcare professional. [address] any warranty or liability for your use of this information. After your next eye exam, have them send us a copy of your report.

## 2018-02-20 NOTE — MR AVS SNAPSHOT
[address] [facility] [address] [facility] 
[phone] Patient: Edith Bee MRN: [id number] Visit Information Date & Time Provider Department Dept. Phone Encounter #  
 2/20/2018 11:45 AM Otilia Farnsworth, [address] [id number] Follow-up Instructions Return in about 6 months (around 8/20/2018). Upcoming Health Maintenance Date Due  
 FOOT EXAM Q1 1/10/1980 EYE EXAM RETINAL OR DILATED Q1 1/10/1980 Pneumococcal 19-64 Medium Risk (1 of 1 - PPSV23) 1/10/1989 PAP AKA CERVICAL CYTOLOGY 1/10/1991 DTaP/Tdap/Td series (1 - Tdap) 3/11/2011 HEMOGLOBIN A1C Q6M 1/21/2018 MICROALBUMIN Q1 7/21/2018 LIPID PANEL Q1 7/21/2018 Allergies as of 2/20/2018  Review Complete On: 2/20/2018 By: Jaison Gonzalez III, DO Severity Noted Reaction Type Reactions Erythromycin  07/11/2017    Other (comments) Severe stomach pains Current Immunizations  Never Reviewed Name Date Td 3/10/2011 Not reviewed this visit You Were Diagnosed With   
  
 Codes Comments Type 2 diabetes mellitus without complication, without long-term current use of insulin (HCC)    -  Primary ICD-10-CM: E11.9 ICD-9-CM: 250.00 Hyperlipidemia associated with type 2 diabetes mellitus ([facility] [address]     ICD-10-CM: E11.69, E78.5 ICD-9-CM: 250.80, 272.4 Obsessive-compulsive disorder, unspecified type     ICD-10-CM: F42.9 ICD-9-CM: 300.3 Type 2 diabetes with nephropathy (HCC)     ICD-10-CM: E11.21 
ICD-9-CM: 250.40, 583.81 Vitals BP Pulse Temp Resp Height(growth percentile) Weight(growth percentile) 134/89 (BP 1 Location: Left arm, BP Patient Position: Sitting) 94 98.1 °F (36.7 °C) (Oral) 16 5' 3.25\" (1.607 m) 150 lb (68 kg) SpO2 BMI OB Status Smoking Status 99% 26.36 kg/m2 Menopause Former Smoker Vitals History BMI and BSA Data Body Mass Index Body Surface Area  
 26.36 kg/m 2 1.74 m 2 Preferred Pharmacy Pharmacy Name Phone [phone] Your Updated Medication List  
  
   
This list is accurate as of: 2/20/18 12:38 PM.  Always use your most recent med list.  
  
  
  
  
 atorvastatin 20 mg tablet Commonly known as:  LIPITOR Take 1 Tab by mouth daily. insulin degludec 100 unit/mL (3 mL) Inpn Commonly known as:  TRESIBA FLEXTOUCH U-100  
20 Units by SubCUTAneous route daily. metFORMIN 1,000 mg tablet Commonly known as:  GLUCOPHAGE Take 1 Tab by mouth two (2) times a day. Indications: taking once daily  
  
 sertraline 100 mg tablet Commonly known as:  ZOLOFT Take 1 Tab by mouth daily. We Performed the Following AMB POC HEMOGLOBIN A1C [95092 CPT(R)]  DIABETES FOOT EXAM [HM7 Custom] Follow-up Instructions Return in about 6 months (around 8/20/2018). Patient Instructions Diabetes Foot Health: Care Instructions Your Care Instructions When you have diabetes, your feet need extra care and attention. Diabetes can damage the nerve endings and blood vessels in your feet, making you less likely to notice when your feet are injured. Diabetes also limits your body's ability to fight infection and get blood to areas that need it. If you get a minor foot injury, it could become an ulcer or a serious infection. With good foot care, you can prevent most of these problems. Caring for your feet can be quick and easy. Most of the care can be done when you are bathing or getting ready for bed. Follow-up care is a key part of your treatment and safety. Be sure to make and go to all appointments, and call your doctor if you are having problems. It's also a good idea to know your test results and keep a list of the medicines you take. How can you care for yourself at home? · Keep your blood sugar close to normal by watching what and how much you eat, monitoring blood sugar, taking medicines if prescribed, and getting regular exercise. · Do not smoke. Smoking affects blood flow and can make foot problems worse. If you need help quitting, talk to your doctor about stop-smoking programs and medicines. These can increase your chances of quitting for good. · Eat a diet that is low in fats. High fat intake can cause fat to build up in your blood vessels and decrease blood flow. · Inspect your feet daily for blisters, cuts, cracks, or sores. If you cannot see well, use a mirror or have someone help you. · Take care of your feet: 
[facility] AUTHORITY your feet every day. Use warm (not hot) water. Check the water temperature with your wrists or other part of your body, not your feet. ¨ Dry your feet well. Pat them dry. Do not rub the skin on your feet too hard. Dry well between your toes. If the skin on your feet stays moist, bacteria or a fungus can grow, which can lead to infection. ¨ Keep your skin soft. Use moisturizing skin cream to keep the skin on your feet soft and prevent calluses and cracks. But do not put the cream between your toes, and stop using any cream that causes a rash. ¨ Clean underneath your toenails carefully. Do not use a sharp object to clean underneath your toenails. Use the blunt end of a nail file or other rounded tool. ¨ Trim and file your toenails straight across to prevent ingrown toenails. Use a nail clipper, not scissors. Use an emery board to smooth the edges. · Change socks daily. Socks without seams are best, because seams often rub the feet. You can find socks for people with diabetes from specialty catalogs. · Look inside your shoes every day for things like gravel or torn linings, which could cause blisters or sores. · Buy shoes that fit well: 
¨ Look for shoes that have plenty of space around the toes. This helps prevent bunions and blisters. ¨ Try on shoes while wearing the kind of socks you will usually wear with the shoes. ¨ Avoid plastic shoes. They may rub your feet and cause blisters. Good shoes should be made of materials that are flexible and breathable, such as leather or cloth. ¨ Break in new shoes slowly by wearing them for no more than an hour a day for several days. Take extra time to check your feet for red areas, blisters, or other problems after you wear new shoes. · Do not go barefoot. Do not wear sandals, and do not wear shoes with very thin soles. Thin soles are easy to puncture. They also do not protect your feet from hot pavement or cold weather. · Have your doctor check your feet during each visit. If you have a foot problem, see your doctor. Do not try to treat an early foot problem at home. Home remedies or treatments that you can buy without a prescription (such as corn removers) can be harmful. · Always get early treatment for foot problems. A minor irritation can lead to a major problem if not properly cared for early. When should you call for help? Call your doctor now or seek immediate medical care if: 
? · You have a foot sore, an ulcer or break in the skin that is not healing after 4 days, bleeding corns or calluses, or an ingrown toenail. ? · You have blue or black areas, which can mean bruising or blood flow problems. ? · You have peeling skin or tiny blisters between your toes or cracking or oozing of the skin. ? · You have a fever for more than 24 hours and a foot sore. ? · You have new numbness or tingling in your feet that does not go away after you move your feet or change positions. ? · You have unexplained or unusual swelling of the foot or ankle. ? Watch closely for changes in your health, and be sure to contact your doctor if: 
? · You cannot do proper foot care. Where can you learn more? Go to http://randy.info/. Enter A739 in the search box to learn more about \"Diabetes Foot Health: Care Instructions. \" Current as of: March 13, 2017 Content Version: 11.4 © 1615-1098 [employer]. Care instructions adapted under license by [employer] (which disclaims liability or warranty for this information). If you have questions about a medical condition or this instruction, always ask your healthcare professional. [address] any warranty or liability for your use of this information. After your next eye exam, have them send us a copy of your report. Introducing [address]! Dear Alma Delia Escudero: Thank you for requesting a [employer] account. Our records indicate that you already have an active [employer] account. You can access your account anytime at [email or web address] Did you know that you can access your hospital and ER discharge instructions at any time in [employer]? You can also review all of your test results from your hospital stay or ER visit. Additional Information If you have questions, please visit the Frequently Asked Questions section of the [employer] website at [email or web address] Remember, [employer] is NOT to be used for urgent needs. For medical emergencies, dial 911. Now available from your iPhone and Android! Please provide this summary of care documentation to your next provider. Your primary care clinician is listed as Hai Avila If you have any questions after today's visit, please call [phone].

## 2018-02-20 NOTE — PROGRESS NOTES
Reviewed record in preparation for visit and have obtained necessary documentation. Identified pt with two pt identifiers(name and ). Chief Complaint   Patient presents with    Diabetes     follow up    Cholesterol Problem       Health Maintenance Due   Topic Date Due    FOOT EXAM Q1  01/10/1980    EYE EXAM RETINAL OR DILATED Q1  01/10/1980    Pneumococcal 19-64 Medium Risk (1 of 1 - PPSV23) 01/10/1989    PAP AKA CERVICAL CYTOLOGY  01/10/1991    DTaP/Tdap/Td series (1 - Tdap) 2011    Influenza Age 9 to Adult  2017    HEMOGLOBIN A1C Q6M  2018       Ms. Casey Merchant has a reminder for a \"due or due soon\" health maintenance. I have asked that she discuss health maintenance topic(s) due with Her  primary care provider. Coordination of Care Questionnaire:  :     1) Have you been to an emergency room, urgent care clinic since your last visit? no   Hospitalized since your last visit? no             2) Have you seen or consulted any other health care providers outside of [address] [place] [address] since your last visit? no  (Include any pap smears or colon screenings in this section.)    3) Do you have an Advance Directive on file? no    4) Are you interested in receiving information on Advance Directives? NO    Patient is accompanied by self I have received verbal consent from Ab Crews to discuss any/all medical information while they are present in the room.

## 2018-02-20 NOTE — PROGRESS NOTES
Jcarlos Forbes is a 50 y.o. female who presents for evaluation of routine follow up. Last seen by me 2017. Doing better, states is taking her insulin and other meds daily. ROS:  Constitutional: negative for fevers, chills, anorexia and weight loss  Eyes:   negative for visual disturbance and irritation  ENT:   negative for tinnitus,sore throat,nasal congestion,ear pain,hoarseness  Respiratory:  negative for cough, hemoptysis, dyspnea,wheezing  CV:   negative for chest pain, palpitations, lower extremity edema  GI:   negative for nausea, vomiting, diarrhea, abdominal pain,melena  Genitourinary: negative for frequency, dysuria and hematuria  Musculoskel: negative for myalgias, arthralgias, back pain, muscle weakness, joint pain  Neurological:  negative for headaches, dizziness, focal weakness, numbness  Psychiatric:     Negative for depression or anxiety      Past Medical History:   Diagnosis Date    Diabetes ([facility] [address]     Menopause     OCD (obsessive compulsive disorder)        Past Surgical History:   Procedure Laterality Date    HX  SECTION       and     HX CHOLECYSTECTOMY      HX OTHER SURGICAL      teeth extracted; has dentures       Family History   Problem Relation Age of Onset    COPD Mother     Hypertension Mother     Cancer Mother     Hypertension Father     Cancer Father     Cancer Sister     Diabetes Maternal Aunt     Diabetes Maternal Uncle        Social History     Social History    Marital status:      Spouse name: N/A    Number of children: N/A    Years of education: N/A     Occupational History    Not on file.      Social History Main Topics    Smoking status: Former Smoker    Smokeless tobacco: Never Used    Alcohol use Yes      Comment: occassionally    Drug use: No    Sexual activity: Yes     Partners: Male     Other Topics Concern    Not on file     Social History Narrative            Visit Vitals    /89 (BP 1 Location: Left arm, BP Patient Position: Sitting)    Pulse 94    Temp 98.1 °F (36.7 °C) (Oral)    Resp 16    Ht 5' 3.25\" (1.607 m)    Wt 150 lb (68 kg)    SpO2 99%    BMI 26.36 kg/m2       Physical Examination:   General - Well appearing female  HEENT - PERRL, TM no erythema/opacification, normal nasal turbinates, no oropharyngeal erythema or exudate, MMM  Neck - supple, no bruits, no thyroidomegaly, no lymphadenopathy  Pulm - clear to auscultation bilaterally  Cardio - RRR, normal S1 S2, no murmur  Abd - soft, nontender, no masses, no HSM  Extrem - no edema, +2 distal pulses  Neuro-  No focal deficits, CN intact         Diabetic foot exam performed by Genevieve Edmonds III, DO     Measurement  Response Nurse Comment Physician Comment   Monofilament  R - normal sensation with micro filament  L - normal sensation with micro filament     Pulse DP R - present  L - present     Pulse TP R - present  L - present     Structural deformity R - None  L - None     Skin Integrity / Deformity R - None  L - None        Reviewed by:              Assessment/Plan:    1. Uncontrolled dm,type 2--last a1c 11.3, check again. On tresiba and glucophage  2.  hyperlipids--on lipitor  3. OCD--continue zoloft  4. Routine adult health maintenance--due for pap/pelvic and eye exam.  Dr melo and ezra scherer on [address]. States had pneumovax few years ago.     rtc 6 months        Genevieve Edmonds III, DO

## 2018-03-30 ENCOUNTER — PATIENT OUTREACH (OUTPATIENT)
Dept: INTERNAL MEDICINE CLINIC | Age: 48
End: 2018-03-30

## 2018-04-11 ENCOUNTER — OFFICE VISIT (OUTPATIENT)
Dept: INTERNAL MEDICINE CLINIC | Age: 48
End: 2018-04-11

## 2018-04-11 VITALS
SYSTOLIC BLOOD PRESSURE: 139 MMHG | HEIGHT: 63 IN | DIASTOLIC BLOOD PRESSURE: 91 MMHG | WEIGHT: 151.2 LBS | HEART RATE: 104 BPM | OXYGEN SATURATION: 100 % | RESPIRATION RATE: 18 BRPM | TEMPERATURE: 98.3 F | BODY MASS INDEX: 26.79 KG/M2

## 2018-04-11 DIAGNOSIS — L03.031 PARONYCHIA OF GREAT TOE, RIGHT: Primary | ICD-10-CM

## 2018-04-11 RX ORDER — CEPHALEXIN 500 MG/1
500 CAPSULE ORAL 2 TIMES DAILY
Qty: 10 CAP | Refills: 0 | Status: SHIPPED | OUTPATIENT
Start: 2018-04-11 | End: 2018-04-16

## 2018-04-11 NOTE — PROGRESS NOTES
SUBJECTIVE:   Ms. Nitesh Navarrete is a 50 y.o. female who is here c/o right great toe pain she noticed 3 days ago when leaving work. She reports the pain sometimes radiates up her foot and leg and sometimes the area around the toe burns. She reports this morning it was less \"boggy. \" She has been soaking the foot in epsom salt baths and applying an antibiotic ointment, without relief. She denies a recent change in shoes. She reports wearing closed toe shoes is very painful. She denies pus or drainage. At this time, she is otherwise doing well and has brought no other complaints to my attention today. PMH:   Past Medical History:   Diagnosis Date    Diabetes ([facility] [address]     Menopause     OCD (obsessive compulsive disorder)      PSH:  has a past surgical history that includes hx cholecystectomy (); hx  section; and hx other surgical.    All: is allergic to erythromycin. MEDS:   Current Outpatient Prescriptions   Medication Sig    cephALEXin (KEFLEX) 500 mg capsule Take 1 Cap by mouth two (2) times a day for 5 days.  insulin degludec (TRESIBA FLEXTOUCH U-100) 100 unit/mL (3 mL) inpn 20 Units by SubCUTAneous route daily.  sertraline (ZOLOFT) 100 mg tablet Take 1 Tab by mouth daily.  metFORMIN (GLUCOPHAGE) 1,000 mg tablet Take 1 Tab by mouth two (2) times a day. Indications: taking once daily    atorvastatin (LIPITOR) 20 mg tablet Take 1 Tab by mouth daily. No current facility-administered medications for this visit. FH: family history includes COPD in her mother; Cancer in her father, mother, and sister; Diabetes in her maternal aunt and maternal uncle; Hypertension in her father and mother. SH:  reports that she has quit smoking. She has never used smokeless tobacco. She reports that she drinks alcohol. She reports that she does not use illicit drugs.      Review of Systems - History obtained from the patient  General ROS: negative  Psychological ROS: negative  Ophthalmic ROS: negative  ENT ROS: negative  Respiratory ROS: no cough, shortness of breath, or wheezing  Cardiovascular ROS: no chest pain or dyspnea on exertion  Gastrointestinal ROS: no abdominal pain, change in bowel habits, or black or bloody stools  Genito-Urinary ROS: negative  Musculoskeletal ROS: right great toe pain  Neurological ROS: negative  Dermatological ROS: negative    OBJECTIVE:   Vitals:   Visit Vitals    BP (!) 139/91 (BP 1 Location: Left arm, BP Patient Position: Sitting)    Pulse (!) 104    Temp 98.3 °F (36.8 °C) (Oral)    Resp 18    Ht 5' 3.25\" (1.607 m)    Wt 151 lb 3.2 oz (68.6 kg)    SpO2 100%    BMI 26.57 kg/m2      Gen: Pleasant 50 y.o.  female in NAD. HEENT: NC/AT. HEART: RRR, No M/G/R.   LUNGS: CTAB No W/R. EXTREMITIES: Warm. No C/C/E.   NEURO: Alert and oriented x 3. Cranial nerves grossly intact. No focal sensory or motor deficits noted. SKIN: There was an area, about 8mm in diameter, of erythema wrapped around the right great toenail, going from the left side of the nail, around the nail, to the medial side of the right great toe. The area was tender with no significant fluctuance. ASSESSMENT/ PLAN:     Diagnoses and all orders for this visit:    1. Paronychia of great toe, right  -     cephALEXin (KEFLEX) 500 mg capsule; Take 1 Cap by mouth two (2) times a day for 5 days. 1. Paronychia of great toe, right  I prescribed Keflex to treat her condition. I recommended continuing to apply an antibiotic ointment, soaking the area, and avoiding shoes that may aggravate the area. Follow-up Disposition:  Return if symptoms worsen or fail to improve. I have reviewed the patient's medications and risks/side effects/benefits were discussed. Diagnosis(-es) explained to patient and questions answered. Literature provided where appropriate.      Written by Suzette Whittaker, as dictated by Sai Miller MD.

## 2018-04-11 NOTE — PROGRESS NOTES
Chief Complaint   Patient presents with    Toe Pain     right great toe, redness and swelling just below the nail x 3 days     Reviewed record in preparation for visit and have obtained necessary documentation. Identified pt with two pt identifiers(name and ). Chief Complaint   Patient presents with    Toe Pain     right great toe, redness and swelling just below the nail x 3 days       There were no vitals filed for this visit. Coordination of Care Questionnaire:  :     1) Have you been to an emergency room, urgent care clinic since your last visit? no   Hospitalized since your last visit? no             2) Have you seen or consulted any other health care providers outside of [address] [place] [address] since your last visit? no  (Include any pap smears or colon screenings in this section.)    3) In the event something were to happen to you and you were unable to speak on your behalf, do you have an Advance Directive/ Living Will in place stating your wishes? NO    Do you have an Advance Directive on file? no    4) Are you interested in receiving information on Advance Directives? NO    Patient is accompanied by daughter I have received verbal consent from Sherri Szymanski to discuss any/all medical information while they are present in the room.

## 2018-04-11 NOTE — LETTER
NOTIFICATION RETURN TO WORK / SCHOOL 
 
4/11/2018 2:45 PM 
 
Ms. Bakari Strong [address] [facility] To Whom It May Concern: 
 
Bakari Strong is currently under the care of [facility]. Please excuse her from work until 4/16/2018. If there are questions or concerns please have the patient contact our office. Sincerely, Alvaro [place] MD

## 2018-04-11 NOTE — MR AVS SNAPSHOT
[address] [facility] 
[phone] Patient: Edgar Reid MRN: [id number] Visit Information Date & Time Provider Department Dept. Phone Encounter #  
 4/11/2018  2:15 PM Aida Varner, [address] [facility] [phone] [id number] Follow-up Instructions Return if symptoms worsen or fail to improve. Upcoming Health Maintenance Date Due  
 EYE EXAM RETINAL OR DILATED Q1 1/10/1980 Pneumococcal 19-64 Medium Risk (1 of 1 - PPSV23) 1/10/1989 PAP AKA CERVICAL CYTOLOGY 1/10/1991 DTaP/Tdap/Td series (1 - Tdap) 3/11/2011 MICROALBUMIN Q1 7/21/2018 LIPID PANEL Q1 7/21/2018 HEMOGLOBIN A1C Q6M 8/20/2018 FOOT EXAM Q1 2/20/2019 Allergies as of 4/11/2018  Review Complete On: 4/11/2018 By: Amada Hilliard LPN Severity Noted Reaction Type Reactions Erythromycin  07/11/2017    Other (comments) Severe stomach pains Current Immunizations  Never Reviewed Name Date Td 3/10/2011 Not reviewed this visit You Were Diagnosed With   
  
 Codes Comments Paronychia of great toe, right    -  Primary ICD-10-CM: L03.031 
ICD-9-CM: 681.11 Vitals BP Pulse Temp Resp Height(growth percentile) Weight(growth percentile) (!) 139/91 (BP 1 Location: Left arm, BP Patient Position: Sitting) (!) 104 98.3 °F (36.8 °C) (Oral) 18 5' 3.25\" (1.607 m) 151 lb 3.2 oz (68.6 kg) SpO2 BMI OB Status Smoking Status 100% 26.57 kg/m2 Menopause Former Smoker BMI and BSA Data Body Mass Index Body Surface Area  
 26.57 kg/m 2 1.75 m 2 Preferred Pharmacy Pharmacy Name Phone [id number] [place] [phone] Your Updated Medication List  
  
   
This list is accurate as of 4/11/18  2:50 PM.  Always use your most recent med list.  
  
  
  
  
 atorvastatin 20 mg tablet Commonly known as:  LIPITOR Take 1 Tab by mouth daily. cephALEXin 500 mg capsule Commonly known as:  Lyndia Rad Take 1 Cap by mouth two (2) times a day for 5 days. insulin degludec 100 unit/mL (3 mL) Inpn Commonly known as:  TRESIBA FLEXTOUCH U-100  
20 Units by SubCUTAneous route daily. metFORMIN 1,000 mg tablet Commonly known as:  GLUCOPHAGE Take 1 Tab by mouth two (2) times a day. Indications: taking once daily  
  
 sertraline 100 mg tablet Commonly known as:  ZOLOFT Take 1 Tab by mouth daily. Prescriptions Sent to Pharmacy Refills  
 cephALEXin (KEFLEX) 500 mg capsule 0 Sig: Take 1 Cap by mouth two (2) times a day for 5 days. Class: Normal  
 Pharmacy: [id number]- [address]  #: [phone] Route: Oral  
  
Follow-up Instructions Return if symptoms worsen or fail to improve. Introducing [facility] SERVICES! Dear Janae Rubio: Thank you for requesting a [employer] account. Our records indicate that you already have an active [employer] account. You can access your account anytime at [email or web address] Did you know that you can access your hospital and ER discharge instructions at any time in [employer]? You can also review all of your test results from your hospital stay or ER visit. Additional Information If you have questions, please visit the Frequently Asked Questions section of the [employer] website at [email or web address] Remember, [employer] is NOT to be used for urgent needs. For medical emergencies, dial 911. Now available from your iPhone and Android! Please provide this summary of care documentation to your next provider. Your primary care clinician is listed as Stevo Medrano If you have any questions after today's visit, please call [phone].

## 2018-07-03 ENCOUNTER — TELEPHONE (OUTPATIENT)
Dept: INTERNAL MEDICINE CLINIC | Age: 48
End: 2018-07-03

## 2018-07-03 DIAGNOSIS — E11.9 TYPE 2 DIABETES MELLITUS WITHOUT COMPLICATION, WITHOUT LONG-TERM CURRENT USE OF INSULIN (HCC): Chronic | ICD-10-CM

## 2018-07-05 DIAGNOSIS — E11.9 TYPE 2 DIABETES MELLITUS WITHOUT COMPLICATION, WITHOUT LONG-TERM CURRENT USE OF INSULIN (HCC): Chronic | ICD-10-CM

## 2018-07-05 RX ORDER — INSULIN DEGLUDEC 100 U/ML
INJECTION, SOLUTION SUBCUTANEOUS
Qty: 2 PEN | Refills: 0 | Status: SHIPPED | COMMUNITY
Start: 2018-07-05 | End: 2019-09-12 | Stop reason: SDUPTHER

## 2018-07-05 NOTE — TELEPHONE ENCOUNTER
Spoke with patient after 2 patient identifiers being note and advised that sample is in office for . Patient expressed understanding and has no further questions at this time.

## 2018-08-06 ENCOUNTER — APPOINTMENT (OUTPATIENT)
Dept: GENERAL RADIOLOGY | Age: 48
End: 2018-08-06
Attending: EMERGENCY MEDICINE
Payer: COMMERCIAL

## 2018-08-06 ENCOUNTER — HOSPITAL ENCOUNTER (EMERGENCY)
Age: 48
Discharge: HOME | End: 2018-08-07
Attending: EMERGENCY MEDICINE
Payer: COMMERCIAL

## 2018-08-06 DIAGNOSIS — I47.1 SVT (SUPRAVENTRICULAR TACHYCARDIA) (HCC): ICD-10-CM

## 2018-08-06 DIAGNOSIS — R73.9 HYPERGLYCEMIA: Primary | ICD-10-CM

## 2018-08-06 LAB
APPEARANCE UR: CLEAR
BACTERIA URNS QL MICRO: NEGATIVE /HPF
BILIRUB UR QL: NEGATIVE
COLOR UR: ABNORMAL
EPITH CASTS URNS QL MICRO: ABNORMAL /LPF
GLUCOSE UR STRIP.AUTO-MCNC: >1000 MG/DL
HGB UR QL STRIP: NEGATIVE
HYALINE CASTS URNS QL MICRO: ABNORMAL /LPF (ref 0–5)
KETONES UR QL STRIP.AUTO: NEGATIVE MG/DL
LEUKOCYTE ESTERASE UR QL STRIP.AUTO: ABNORMAL
NITRITE UR QL STRIP.AUTO: NEGATIVE
PH UR STRIP: 6.5 [PH] (ref 5–8)
PROT UR STRIP-MCNC: NEGATIVE MG/DL
RBC #/AREA URNS HPF: ABNORMAL /HPF (ref 0–5)
SP GR UR REFRACTOMETRY: 1.02 (ref 1–1.03)
UA: UC IF INDICATED,UAUC: ABNORMAL
UROBILINOGEN UR QL STRIP.AUTO: 0.2 EU/DL (ref 0.2–1)
WBC URNS QL MICRO: ABNORMAL /HPF (ref 0–4)

## 2018-08-06 PROCEDURE — 71046 X-RAY EXAM CHEST 2 VIEWS: CPT

## 2018-08-06 PROCEDURE — 94762 N-INVAS EAR/PLS OXIMTRY CONT: CPT

## 2018-08-06 PROCEDURE — 81001 URINALYSIS AUTO W/SCOPE: CPT | Performed by: EMERGENCY MEDICINE

## 2018-08-06 PROCEDURE — 99285 EMERGENCY DEPT VISIT HI MDM: CPT

## 2018-08-06 PROCEDURE — 87086 URINE CULTURE/COLONY COUNT: CPT | Performed by: EMERGENCY MEDICINE

## 2018-08-06 PROCEDURE — 93005 ELECTROCARDIOGRAM TRACING: CPT

## 2018-08-06 NOTE — LETTER
[address] 
[facility] EMERGENCY DEPT 
[address] [place] [address] [address] [id number] [phone] Work/School Note Date: 8/6/2018 To Whom It May concern: 
 
Danuta Pedro was seen and treated today in the emergency room by the following provider(s): 
Attending Provider: Keira Torres DO. Danuta Pedro return to work on 8/8/2018.  
 
 
Sincerely, 
 
 
 
 
Keira Torres DO

## 2018-08-07 VITALS
SYSTOLIC BLOOD PRESSURE: 128 MMHG | OXYGEN SATURATION: 99 % | HEART RATE: 81 BPM | BODY MASS INDEX: 26.91 KG/M2 | DIASTOLIC BLOOD PRESSURE: 78 MMHG | TEMPERATURE: 98.7 F | RESPIRATION RATE: 14 BRPM | WEIGHT: 151.9 LBS | HEIGHT: 63 IN

## 2018-08-07 LAB
ALBUMIN SERPL-MCNC: 3.8 G/DL (ref 3.5–5)
ALBUMIN/GLOB SERPL: 1.1 {RATIO} (ref 1.1–2.2)
ALP SERPL-CCNC: 193 U/L (ref 45–117)
ALT SERPL-CCNC: 145 U/L (ref 12–78)
ANION GAP SERPL CALC-SCNC: 8 MMOL/L (ref 5–15)
AST SERPL-CCNC: 148 U/L (ref 15–37)
ATRIAL RATE: 122 BPM
BASOPHILS # BLD: 0.1 K/UL (ref 0–0.1)
BASOPHILS NFR BLD: 1 % (ref 0–1)
BILIRUB SERPL-MCNC: 0.3 MG/DL (ref 0.2–1)
BUN SERPL-MCNC: 11 MG/DL (ref 6–20)
BUN/CREAT SERPL: 10 (ref 12–20)
CALCIUM SERPL-MCNC: 8.7 MG/DL (ref 8.5–10.1)
CALCULATED P AXIS, ECG09: 96 DEGREES
CALCULATED R AXIS, ECG10: 13 DEGREES
CALCULATED T AXIS, ECG11: 18 DEGREES
CHLORIDE SERPL-SCNC: 100 MMOL/L (ref 97–108)
CK MB CFR SERPL CALC: NORMAL % (ref 0–2.5)
CK MB SERPL-MCNC: <1 NG/ML (ref 5–25)
CK SERPL-CCNC: 84 U/L (ref 26–192)
CO2 SERPL-SCNC: 26 MMOL/L (ref 21–32)
CREAT SERPL-MCNC: 1.11 MG/DL (ref 0.55–1.02)
D DIMER PPP FEU-MCNC: <0.17 MG/L FEU (ref 0–0.65)
DIAGNOSIS, 93000: NORMAL
DIFFERENTIAL METHOD BLD: ABNORMAL
EOSINOPHIL # BLD: 0 K/UL (ref 0–0.4)
EOSINOPHIL NFR BLD: 1 % (ref 0–7)
ERYTHROCYTE [DISTWIDTH] IN BLOOD BY AUTOMATED COUNT: 12.8 % (ref 11.5–14.5)
GLOBULIN SER CALC-MCNC: 3.4 G/DL (ref 2–4)
GLUCOSE BLD STRIP.AUTO-MCNC: 248 MG/DL (ref 65–100)
GLUCOSE SERPL-MCNC: 536 MG/DL (ref 65–100)
HCT VFR BLD AUTO: 39.7 % (ref 35–47)
HGB BLD-MCNC: 13.5 G/DL (ref 11.5–16)
IMM GRANULOCYTES # BLD: 0 K/UL (ref 0–0.04)
IMM GRANULOCYTES NFR BLD AUTO: 1 % (ref 0–0.5)
LACTATE SERPL-SCNC: 2.6 MMOL/L (ref 0.4–2)
LIPASE SERPL-CCNC: 400 U/L (ref 73–393)
LYMPHOCYTES # BLD: 1.8 K/UL (ref 0.8–3.5)
LYMPHOCYTES NFR BLD: 28 % (ref 12–49)
MCH RBC QN AUTO: 27.7 PG (ref 26–34)
MCHC RBC AUTO-ENTMCNC: 34 G/DL (ref 30–36.5)
MCV RBC AUTO: 81.5 FL (ref 80–99)
MONOCYTES # BLD: 0.3 K/UL (ref 0–1)
MONOCYTES NFR BLD: 5 % (ref 5–13)
NEUTS SEG # BLD: 4.1 K/UL (ref 1.8–8)
NEUTS SEG NFR BLD: 65 % (ref 32–75)
NRBC # BLD: 0 K/UL (ref 0–0.01)
NRBC BLD-RTO: 0 PER 100 WBC
P-R INTERVAL, ECG05: 138 MS
PLATELET # BLD AUTO: 201 K/UL (ref 150–400)
PMV BLD AUTO: 11.2 FL (ref 8.9–12.9)
POTASSIUM SERPL-SCNC: 4.1 MMOL/L (ref 3.5–5.1)
PROT SERPL-MCNC: 7.2 G/DL (ref 6.4–8.2)
Q-T INTERVAL, ECG07: 328 MS
QRS DURATION, ECG06: 76 MS
QTC CALCULATION (BEZET), ECG08: 467 MS
RBC # BLD AUTO: 4.87 M/UL (ref 3.8–5.2)
SERVICE CMNT-IMP: ABNORMAL
SODIUM SERPL-SCNC: 134 MMOL/L (ref 136–145)
T4 FREE SERPL-MCNC: 1.1 NG/DL (ref 0.8–1.5)
TROPONIN I SERPL-MCNC: 0.07 NG/ML
TROPONIN I SERPL-MCNC: 0.16 NG/ML
TSH SERPL DL<=0.05 MIU/L-ACNC: 1.68 UIU/ML (ref 0.36–3.74)
VENTRICULAR RATE, ECG03: 122 BPM
WBC # BLD AUTO: 6.4 K/UL (ref 3.6–11)

## 2018-08-07 PROCEDURE — 84443 ASSAY THYROID STIM HORMONE: CPT | Performed by: EMERGENCY MEDICINE

## 2018-08-07 PROCEDURE — 85379 FIBRIN DEGRADATION QUANT: CPT | Performed by: EMERGENCY MEDICINE

## 2018-08-07 PROCEDURE — 84439 ASSAY OF FREE THYROXINE: CPT | Performed by: EMERGENCY MEDICINE

## 2018-08-07 PROCEDURE — 36415 COLL VENOUS BLD VENIPUNCTURE: CPT | Performed by: EMERGENCY MEDICINE

## 2018-08-07 PROCEDURE — 83690 ASSAY OF LIPASE: CPT | Performed by: EMERGENCY MEDICINE

## 2018-08-07 PROCEDURE — 84484 ASSAY OF TROPONIN QUANT: CPT | Performed by: EMERGENCY MEDICINE

## 2018-08-07 PROCEDURE — 74011250636 HC RX REV CODE- 250/636: Performed by: EMERGENCY MEDICINE

## 2018-08-07 PROCEDURE — 80053 COMPREHEN METABOLIC PANEL: CPT | Performed by: EMERGENCY MEDICINE

## 2018-08-07 PROCEDURE — 85025 COMPLETE CBC W/AUTO DIFF WBC: CPT | Performed by: EMERGENCY MEDICINE

## 2018-08-07 PROCEDURE — 96361 HYDRATE IV INFUSION ADD-ON: CPT

## 2018-08-07 PROCEDURE — 82962 GLUCOSE BLOOD TEST: CPT

## 2018-08-07 PROCEDURE — 83605 ASSAY OF LACTIC ACID: CPT | Performed by: EMERGENCY MEDICINE

## 2018-08-07 PROCEDURE — 74011636637 HC RX REV CODE- 636/637: Performed by: EMERGENCY MEDICINE

## 2018-08-07 PROCEDURE — 96374 THER/PROPH/DIAG INJ IV PUSH: CPT

## 2018-08-07 PROCEDURE — 82550 ASSAY OF CK (CPK): CPT | Performed by: EMERGENCY MEDICINE

## 2018-08-07 RX ORDER — METOPROLOL TARTRATE 25 MG/1
12.5 TABLET, FILM COATED ORAL 2 TIMES DAILY
Qty: 30 TAB | Refills: 0 | Status: SHIPPED | OUTPATIENT
Start: 2018-08-07 | End: 2022-07-01

## 2018-08-07 RX ORDER — METOPROLOL TARTRATE 25 MG/1
12.5 TABLET, FILM COATED ORAL 2 TIMES DAILY
Qty: 15 TAB | Refills: 0 | Status: SHIPPED | OUTPATIENT
Start: 2018-08-07 | End: 2018-08-07

## 2018-08-07 RX ADMIN — SODIUM CHLORIDE 1000 ML: 900 INJECTION, SOLUTION INTRAVENOUS at 01:54

## 2018-08-07 RX ADMIN — INSULIN HUMAN 10 UNITS: 100 INJECTION, SOLUTION PARENTERAL at 02:22

## 2018-08-07 RX ADMIN — SODIUM CHLORIDE 1000 ML: 900 INJECTION, SOLUTION INTRAVENOUS at 00:36

## 2018-08-07 NOTE — ED PROVIDER NOTES
EMERGENCY DEPARTMENT HISTORY AND PHYSICAL EXAM      Date: 8/6/2018  Patient Name: Dax Merino    History of Presenting Illness     Chief Complaint   Patient presents with    Palpitations     onset approx 2200       History Provided By: Patient    HPI: Dax Merino, 50 y.o. female with PMHx significant for SVT, presents by POV to the ED with cc of palpitations. Chief Complaint: Palpitations  Duration: Tonight starting at 10:00PM with pain between the shoulder blades  Timing:  Acute  Location: Pain in shoulder blades, has since resolved  Quality: Dull and Tightness  Severity: Moderate  Modifying Factors: No alleviating or exacerbating factors  Associated Symptoms: pt states sudden onset of feeling her heart race, placed pulse ox reading  bpm, pt attempted vagal manuvers without any relief, pt had pressure in between her shoulder blades non-radiating, felt SOA, no diaphoresis, no n/v.  Pt states remote hx about 10 years ago with SVT converted after 2nd dose of adenosine, no f/u with cardiology but no reoccurrence. Pt denies and recent illness, no OTC medications, no change in her caffeine intake. Pt denies any weight loss or weight gain, no recent illness. Pt states rate came down its own but still fast and she is still having palpitations. There are no other complaints, changes, or physical findings at this time. PCP: Jarred Brar III, DO    Current Outpatient Prescriptions   Medication Sig Dispense Refill    metoprolol tartrate (LOPRESSOR) 25 mg tablet Take 0.5 Tabs by mouth two (2) times a day. 30 Tab 0    atorvastatin (LIPITOR) 20 mg tablet TAKE ONE TABLET BY MOUTH ONCE DAILY 90 Tab 3    insulin degludec (TRESIBA FLEXTOUCH U-100) 100 unit/mL (3 mL) inpn 20 Units by SubCUTAneous route daily. 2 Pen 0    sertraline (ZOLOFT) 100 mg tablet Take 1 Tab by mouth daily. 90 Tab 3    metFORMIN (GLUCOPHAGE) 1,000 mg tablet Take 1 Tab by mouth two (2) times a day.  Indications: taking once daily 180 Tab 3       Past History     Past Medical History:  Past Medical History:   Diagnosis Date    Diabetes ([address]     Menopause 2014    OCD (obsessive compulsive disorder)        Past Surgical History:  Past Surgical History:   Procedure Laterality Date    HX  SECTION       and     HX CHOLECYSTECTOMY      HX OTHER SURGICAL      teeth extracted; has dentures       Family History:  Family History   Problem Relation Age of Onset   Nolia Stamp COPD Mother    Nolia Stamp Hypertension Mother    [place] Stamp Cancer Mother     Hypertension Father     Cancer Father     Cancer Sister     Diabetes Maternal Aunt     Diabetes Maternal Uncle        Social History:  Social History   Substance Use Topics    Smoking status: Former Smoker    Smokeless tobacco: Never Used    Alcohol use Yes      Comment: occassionally       Allergies: Allergies   Allergen Reactions    Erythromycin Other (comments)     Severe stomach pains         Review of Systems   Review of Systems   Constitutional: Negative. Negative for activity change, appetite change, chills, fatigue, fever and unexpected weight change. HENT: Negative. Negative for congestion, rhinorrhea, sinus pressure and sore throat. Eyes: Negative. Respiratory: Negative. Negative for cough, choking, chest tightness, shortness of breath and wheezing. Cardiovascular: Positive for chest pain (pain in between shoulder blades ) and palpitations. Negative for leg swelling. Gastrointestinal: Negative for abdominal pain, constipation, diarrhea, nausea and vomiting. Endocrine: Negative. Genitourinary: Negative. Negative for difficulty urinating, dysuria, flank pain and urgency. Musculoskeletal: Negative. Skin: Negative. Negative for rash. Neurological: Negative. Negative for dizziness, speech difficulty, weakness, light-headedness, numbness and headaches. Psychiatric/Behavioral: Negative. All other systems reviewed and are negative.       Physical Exam   Physical Exam   Constitutional: She is oriented to person, place, and time. She appears well-developed and well-nourished. HENT:   Head: Normocephalic and atraumatic. Right Ear: External ear normal.   Left Ear: External ear normal.   Mouth/Throat: Oropharynx is clear and moist.   Eyes: Conjunctivae and EOM are normal. Pupils are equal, round, and reactive to light. Neck: Normal range of motion. Neck supple. No JVD present. No tracheal deviation present. No thyromegaly (No tenderness over the thyroid) present. Cardiovascular: Regular rhythm, normal heart sounds and intact distal pulses. No murmur heard. Tachycardic     Pulmonary/Chest: Effort normal and breath sounds normal. No stridor. No respiratory distress. She has no wheezes. She has no rales. Abdominal: Soft. Bowel sounds are normal. She exhibits no distension. There is no tenderness. There is no rebound and no guarding. Musculoskeletal: Normal range of motion. She exhibits no edema or tenderness. Neurological: She is alert and oriented to person, place, and time. No cranial nerve deficit. No nystagmus, no dysmetria, no focal motor or sensory deficits   Skin: Skin is warm and dry. Psychiatric: She has a normal mood and affect. Her behavior is normal.   Nursing note and vitals reviewed.       Diagnostic Study Results     Labs -     Recent Results (from the past 12 hour(s))   URINALYSIS W/ REFLEX CULTURE    Collection Time: 08/06/18 11:08 PM   Result Value Ref Range    Color YELLOW/STRAW      Appearance CLEAR CLEAR      Specific gravity 1.024 1.003 - 1.030      pH (UA) 6.5 5.0 - 8.0      Protein NEGATIVE  NEG mg/dL    Glucose >1000 (A) NEG mg/dL    Ketone NEGATIVE  NEG mg/dL    Bilirubin NEGATIVE  NEG      Blood NEGATIVE  NEG      Urobilinogen 0.2 0.2 - 1.0 EU/dL    Nitrites NEGATIVE  NEG      Leukocyte Esterase SMALL (A) NEG      WBC 10-20 0 - 4 /hpf    RBC 0-5 0 - 5 /hpf    Epithelial cells FEW FEW /lpf    Bacteria NEGATIVE  NEG /hpf UA: UC IF INDICATED URINE CULTURE ORDERED (A) CNI      Hyaline cast 0-2 0 - 5 /lpf   CBC WITH AUTOMATED DIFF    Collection Time: 08/07/18 12:16 AM   Result Value Ref Range    WBC 6.4 3.6 - 11.0 K/uL    RBC 4.87 3.80 - 5.20 M/uL    HGB 13.5 11.5 - 16.0 g/dL    HCT 39.7 35.0 - 47.0 %    MCV 81.5 80.0 - 99.0 FL    MCH 27.7 26.0 - 34.0 PG    MCHC 34.0 30.0 - 36.5 g/dL    RDW 12.8 11.5 - 14.5 %    PLATELET 259 797 - 292 K/uL    MPV 11.2 8.9 - 12.9 FL    NRBC 0.0 0  WBC    ABSOLUTE NRBC 0.00 0.00 - 0.01 K/uL    NEUTROPHILS 65 32 - 75 %    LYMPHOCYTES 28 12 - 49 %    MONOCYTES 5 5 - 13 %    EOSINOPHILS 1 0 - 7 %    BASOPHILS 1 0 - 1 %    IMMATURE GRANULOCYTES 1 (H) 0.0 - 0.5 %    ABS. NEUTROPHILS 4.1 1.8 - 8.0 K/UL    ABS. LYMPHOCYTES 1.8 0.8 - 3.5 K/UL    ABS. MONOCYTES 0.3 0.0 - 1.0 K/UL    ABS. EOSINOPHILS 0.0 0.0 - 0.4 K/UL    ABS. BASOPHILS 0.1 0.0 - 0.1 K/UL    ABS. IMM. GRANS. 0.0 0.00 - 0.04 K/UL    DF AUTOMATED     CK W/ CKMB & INDEX    Collection Time: 08/07/18 12:16 AM   Result Value Ref Range    CK 84 26 - 192 U/L    CK - MB <1.0 <3.6 NG/ML    CK-MB Index Cannot be calculated 0 - 2.5     METABOLIC PANEL, COMPREHENSIVE    Collection Time: 08/07/18 12:16 AM   Result Value Ref Range    Sodium 134 (L) 136 - 145 mmol/L    Potassium 4.1 3.5 - 5.1 mmol/L    Chloride 100 97 - 108 mmol/L    CO2 26 21 - 32 mmol/L    Anion gap 8 5 - 15 mmol/L    Glucose 536 (H) 65 - 100 mg/dL    BUN 11 6 - 20 MG/DL    Creatinine 1.11 (H) 0.55 - 1.02 MG/DL    BUN/Creatinine ratio 10 (L) 12 - 20      GFR est AA >60 >60 ml/min/1.73m2    GFR est non-AA 52 (L) >60 ml/min/1.73m2    Calcium 8.7 8.5 - 10.1 MG/DL    Bilirubin, total 0.3 0.2 - 1.0 MG/DL    ALT (SGPT) 145 (H) 12 - 78 U/L    AST (SGOT) 148 (H) 15 - 37 U/L    Alk.  phosphatase 193 (H) 45 - 117 U/L    Protein, total 7.2 6.4 - 8.2 g/dL    Albumin 3.8 3.5 - 5.0 g/dL    Globulin 3.4 2.0 - 4.0 g/dL    A-G Ratio 1.1 1.1 - 2.2     TROPONIN I    Collection Time: 08/07/18 12:16 AM Result Value Ref Range    Troponin-I, Qt. 0.07 (H) <0.05 ng/mL   LACTIC ACID    Collection Time: 08/07/18 12:16 AM   Result Value Ref Range    Lactic acid 2.6 (HH) 0.4 - 2.0 MMOL/L   LIPASE    Collection Time: 08/07/18 12:16 AM   Result Value Ref Range    Lipase 400 (H) 73 - 393 U/L   D DIMER    Collection Time: 08/07/18 12:16 AM   Result Value Ref Range    D-dimer <0.17 0.00 - 0.65 mg/L FEU   TSH 3RD GENERATION    Collection Time: 08/07/18 12:16 AM   Result Value Ref Range    TSH 1.68 0.36 - 3.74 uIU/mL   TROPONIN I    Collection Time: 08/07/18  3:25 AM   Result Value Ref Range    Troponin-I, Qt. 0.16 (H) <0.05 ng/mL   GLUCOSE, POC    Collection Time: 08/07/18  3:27 AM   Result Value Ref Range    Glucose (POC) 248 (H) 65 - 100 mg/dL    Performed by Johnie Ward        Radiologic Studies -   XR CHEST PA LAT   Final Result        CT Results  (Last 48 hours)    None        CXR Results  (Last 48 hours)               08/06/18 2335  XR CHEST PA LAT Final result    Impression:  IMPRESSION:   NORMAL CHEST. Narrative:  History: Palpitations, onset 2200. Shortness of breath. Frontal and lateral views of the chest show clear lungs. The heart, mediastinum   and pulmonary vasculature are normal.  There are mild degenerative changes of   the spine. Medical Decision Making   I am the first provider for this patient. I reviewed the vital signs, available nursing notes, past medical history, past surgical history, family history and social history. Vital Signs-Reviewed the patient's vital signs.   Patient Vitals for the past 12 hrs:   Temp Pulse Resp BP SpO2   08/07/18 0500 - 81 14 128/78 99 %   08/07/18 0439 - 80 14 - 100 %   08/07/18 0432 - 94 16 - 100 %   08/07/18 0431 - 100 - 129/84 -   08/07/18 0345 - 87 14 - 99 %   08/07/18 0300 - 86 17 - 99 %   08/07/18 0200 - 94 15 - 100 %   08/06/18 2259 98.7 °F (37.1 °C) (!) 137 18 (!) 155/94 98 %       Pulse Oximetry Analysis - 98% on RA    Cardiac Monitor:   Rate: 130 bpm  Rhythm: Sinus Tachycardia      EKG interpretation: (Preliminary)  Rhythm: sinus tachycardia; and regular . Rate (approx.): 122; Axis: normal; GA interval: normal; QRS interval: normal ; ST/T wave: normal; Manish Reynolds V      Records Reviewed: Old Medical Records    Provider Notes (Medical Decision Making):   Electrolyte abnormality, PE, ACS, Thyroid dysfunction, dehydration    ED Course:   Initial assessment performed. The patients presenting problems have been discussed, and they are in agreement with the care plan formulated and outlined with them. I have encouraged them to ask questions as they arise throughout their visit. SIGN OUT:  4:59 AM  Patient's presentation, labs/imaging and plan of care was reviewed with Sterling Herrera MD as part of sign out. They will await secondary labs as part of the plan discussed with the patient. Sterling Herrera MD's assistance in completion of this plan is greatly appreciated but it should be noted that I will be the provider of record for this patient. PROGRESS NOTE  4:59 AM   Pt's secondary troponin has resulted 0.16. Will consult cardiology. Consult Note:  5:05 AM  Sterling Herrera MD spoke with Dr. Joe Nelson,  Specialty: Cardiology  Discussed pt's hx, disposition, and available diagnostic and imaging results. Reviewed care plans. Consultant agrees with plans as outlined. Has no concern, recommends d/c with follow-up outpatient. 5:08 AM  Progress note:  Pt noted to be ready for discharge. Discussed lab and imaging findings with pt and/or family. Will follow up as instructed. All questions have been answered, pt voiced understanding and agreement with plan. If narcotics were prescribed, pt was advised not to drive or operate heavy machinery. If abx were prescribed, pt advised that diarrhea and rash are possible side effects of the medications.    Specific return precautions provided as well as instructions to return to the ED should sx worsen at any time. Alma Rosa Jackman MD      Critical Care Time:   None    Disposition:  Discharge Note:  5:07 AM  The pt is ready for discharge. The pt's signs, symptoms, diagnosis, and discharge instructions have been discussed and pt has conveyed their understanding. The pt is to follow up as recommended or return to ER should their symptoms worsen. Plan has been discussed and pt is in agreement. PLAN:  1. Current Discharge Medication List      START taking these medications    Details   metoprolol tartrate (LOPRESSOR) 25 mg tablet Take 0.5 Tabs by mouth two (2) times a day. Qty: 30 Tab, Refills: 0           2. Follow-up Information     Follow up With Details Comments [address], DO   [address]  [address]  [address] Patricia Edmonds MD  SVT [address] [facility]  [phone]          Return to ED if worse     Diagnosis     Clinical Impression:   1. Hyperglycemia    2.  SVT (supraventricular tachycardia) ([address]        Attestations:

## 2018-08-07 NOTE — ED NOTES
Patient denies chest pain or shortness of breath at this time. 2nd troponin resulted. Dr. Amada Ho notified of result troponin 0.16 increased from previous result 0.07    Pending disposition at this time. Family at bedside.

## 2018-08-07 NOTE — ED TRIAGE NOTES
Pt arrives to ED after checking her HR after biking approx . 25 miles and feeling like her heart was racing and felt SOB.  Pt states her HR was sustained above 200 bpm for over an hour PTA  Pt has had bouts of SVT in the past and states she was treated with adenosine   Pt placed on monitor x3, call bell in reach

## 2018-08-08 ENCOUNTER — PATIENT OUTREACH (OUTPATIENT)
Dept: OTHER | Age: 48
End: 2018-08-08

## 2018-08-08 ENCOUNTER — PATIENT OUTREACH (OUTPATIENT)
Dept: INTERNAL MEDICINE CLINIC | Age: 48
End: 2018-08-08

## 2018-08-08 LAB
BACTERIA SPEC CULT: NORMAL
CC UR VC: NORMAL
SERVICE CMNT-IMP: NORMAL

## 2018-08-08 NOTE — PROGRESS NOTES
Chart reviewed. Reached out to pt regarding uncontrolled diabetes. Pt's last A1c was 10.7% on 2/20/18. Left message for pt to call back if she would like further diabetes education. Lab Results   Component Value Date/Time    Hemoglobin A1c 11.3 (H) 07/21/2017 08:34 AM       No future appointments.      Last Appointment My Department:  2/20/2018 Detail Level: None Urine Pregnancy Test Result: negative Performed By: Nadia Puga

## 2018-08-08 NOTE — PROGRESS NOTES
Chart reviewed. Reached out to pt regarding follow up of uncontrolled diabetes. Pt was seen in the ED on 8/6/18 for palpitations. Pt scheduled appt for diabetes education on 8/27/18 at 1:30.

## 2018-08-08 NOTE — PROGRESS NOTES
[facility] progress note    Patient eligible for [employer] care management     PMH:   Past Medical History:   Diagnosis Date    [address] [address]     Menopause 2014    OCD (obsessive compulsive disorder)        Current Outpatient Prescriptions   Medication Sig    atorvastatin (LIPITOR) 20 mg tablet TAKE ONE TABLET BY MOUTH ONCE DAILY    insulin degludec (TRESIBA FLEXTOUCH U-100) 100 unit/mL (3 mL) inpn 20 Units by SubCUTAneous route daily.  metFORMIN (GLUCOPHAGE) 1,000 mg tablet Take 1 Tab by mouth two (2) times a day. Indications: taking once daily    metoprolol tartrate (LOPRESSOR) 25 mg tablet Take 0.5 Tabs by mouth two (2) times a day.  sertraline (ZOLOFT) 100 mg tablet Take 1 Tab by mouth daily. No current facility-administered medications for this visit. Social History:   Social History     Social History    Marital status:      Spouse name: N/A    Number of children: N/A    Years of education: N/A     Occupational History    Not on file. Social History Main Topics    Smoking status: Former Smoker    Smokeless tobacco: Never Used    Alcohol use Yes      Comment: occassionally    Drug use: No    Sexual activity: Yes     Partners: Male     Other Topics Concern    Not on file     Social History Narrative       Two patient identifiers verified. Discussed the care management program.  Patient agrees to care management services at this time. Patient has significant diagnosis of hyperglycemia, heart palpitations and history of diabetes type 2, hyperlipidemia. .     Care management assessment completed:    Patient stated problem: \"I need to get better control of my blood sugar\"    Care manager identified primary concern: complications from elevated blood sugars, better blood sugar control & maintenance. Emotional Status/Adjustment to Health State: patient able to verbalize concerns.     Readiness to Change: []  Pre-contemplative    [x]  Contemplative  [] Preparation               []  Action                  []  Maintenance    Barriers/Challenges to Care: []  Decline in memory    []  Language barrier     []  Emotional                  []  Limited mobility  []  Lack of motivation     [] Vision, hearing or cognitive impairment [x]  Knowledge [] Financial Barriers  []  Other     Patient stated goal   Better blood glucose control    Care Manager/Provider identified medical goal   Better blood sugar control and maintenance, follow up with cardiology for SVT evaluation. Support System/Resources: family and spouse    Advance Care Planning: deferred     ADLS/DME: na    Referrals: none at this time      Upcoming appointments: Future Appointments  Date Time Provider Jose M Ivan   8/27/2018 9:30 AM Sally Coulter  [facility]:    1. Diagnosis 1- diabetes type 2    2. Diagnosis 2- SVT, palpitations (hx of SVT)    3. Focused Assessment-    Diabetes Condition Focused Assessment    Skin- any open wounds or incisions? no  Description of wound- na  New or worsening pain? no  If yes, pain rated 0-10: na   New or worsening numbness or tingling? no  If yes, location of numbness and tingling: na    Patient reported important numbers to know:  Last BG Fasting today = 200   Last A1C 10.7 - 2/20/18   Urine protein (microalbumin) neg or pos? 37.4 - 7/11/18   Weight 151 lbs   /78     Last lipid panel:   7/22/2017 10:36 AM - [place], [facility] Lab Results In   Component Results   Component Value Flag Ref Range Units Status   Cholesterol, total 176  100 - 199 mg/dL Final   Triglyceride 124  0 - 149 mg/dL Final   HDL Cholesterol 44  >39 mg/dL Final   VLDL, calculated 25  5 - 40 mg/dL Final   LDL, calculated 107 (H) 0 - 99 mg/dL Final        Activity level- moving several times a day, or as recommended? yes  Abnormal activity level reported: na   Nutrition- prescribed diet?  yes   Diet prescribed or recommended: diabetic diet  Diabetic medication changes none       4. Key pt activities to achieve better health:   []  Weight loss  [x]  Improved diabetic control  []  Decreased cholesterol levels  []  Decreased blood pressure  []    []    Patient is checking blood sugars more frequently now, has the [employer] blood monitoring system at home. Patient verbalized understanding of all information discussed. Pt has my contact information for any further questions, concerns, or needs. Plan for next call:    Evaluate symptoms of increased blood sugar. Evaluate blood sugar levels. Any symptoms of SVT? Taking metoprolol? Follow up appointment made with PCP?

## 2018-08-08 NOTE — PROGRESS NOTES
Patient identified as eligible for [address] [place] [address] services. First telephone outreach attempted for post ED assessment. Left discreet voicemail with this CM confidential contact information.

## 2018-08-12 RX ORDER — INSULIN DEGLUDEC INJECTION 200 U/ML
INJECTION, SOLUTION SUBCUTANEOUS
Qty: 27 ADJUSTABLE DOSE PRE-FILLED PEN SYRINGE | Refills: 3 | Status: SHIPPED | OUTPATIENT
Start: 2018-08-12 | End: 2018-08-13 | Stop reason: SDUPTHER

## 2018-08-13 RX ORDER — INSULIN DEGLUDEC 200 U/ML
INJECTION, SOLUTION SUBCUTANEOUS
Qty: 27 ADJUSTABLE DOSE PRE-FILLED PEN SYRINGE | Refills: 3 | Status: SHIPPED | OUTPATIENT
Start: 2018-08-13 | End: 2018-08-27 | Stop reason: SDUPTHER

## 2018-08-13 NOTE — TELEPHONE ENCOUNTER
Pt will need a refill of \"tresida\"     Pt uses the [address] [phone]     Best contact for the pt is [phone]              Message copied/pasted from [facility]

## 2018-08-15 ENCOUNTER — PATIENT OUTREACH (OUTPATIENT)
Dept: OTHER | Age: 48
End: 2018-08-15

## 2018-08-17 ENCOUNTER — PATIENT OUTREACH (OUTPATIENT)
Dept: OTHER | Age: 48
End: 2018-08-17

## 2018-08-17 NOTE — PROGRESS NOTES
Patient returned call. States she is making lifestyle changes to help with blood sugar control including diet, exercise, and MD follow up. States she is drinking more water and less soda now. Discussed avoiding \"sweets\" and choosing healthier options. She receives support from her spouse, family, and neighbors, states they are holding her accountable for her actions. Patient has purchased a book to help with dietary changes. Diabetes Condition Focused Assessment    Skin- any open wounds or incisions? no  Description of wound- na  New or worsening pain? no  If yes, pain rated 0-10: na  Location/pain characteristics: na   New or worsening numbness or tingling? no  If yes, location of numbness and tingling: na    Patient reported important numbers to know:  Last  fasting this am   Last A1C 10.7   Urine protein (microalbumin) neg or pos? Weight    BP        Activity level- moving several times a day, or as recommended? yes  Abnormal activity level reported: no   Nutrition- prescribed diet? yes   Diet prescribed or recommended: diabetic  Diabetic medication changes no    Patient requesting information on diabetic education classes near [facility] in [address] - follow up in 1 week - DM symptoms?, SVT?, blood sugar level?, diet changes?

## 2018-08-23 ENCOUNTER — PATIENT OUTREACH (OUTPATIENT)
Dept: OTHER | Age: 48
End: 2018-08-23

## 2018-08-27 ENCOUNTER — OFFICE VISIT (OUTPATIENT)
Dept: CARDIOLOGY CLINIC | Age: 48
End: 2018-08-27

## 2018-08-27 VITALS
WEIGHT: 147.9 LBS | BODY MASS INDEX: 26.21 KG/M2 | OXYGEN SATURATION: 99 % | HEIGHT: 63 IN | DIASTOLIC BLOOD PRESSURE: 86 MMHG | SYSTOLIC BLOOD PRESSURE: 136 MMHG | HEART RATE: 91 BPM

## 2018-08-27 DIAGNOSIS — E11.69 HYPERLIPIDEMIA ASSOCIATED WITH TYPE 2 DIABETES MELLITUS (HCC): Chronic | ICD-10-CM

## 2018-08-27 DIAGNOSIS — I47.1 SVT (SUPRAVENTRICULAR TACHYCARDIA) (HCC): ICD-10-CM

## 2018-08-27 DIAGNOSIS — R00.2 PALPITATION: Primary | ICD-10-CM

## 2018-08-27 DIAGNOSIS — E11.21 TYPE 2 DIABETES WITH NEPHROPATHY (HCC): Chronic | ICD-10-CM

## 2018-08-27 DIAGNOSIS — Z76.89 ENCOUNTER TO ESTABLISH CARE: ICD-10-CM

## 2018-08-27 DIAGNOSIS — E78.5 HYPERLIPIDEMIA ASSOCIATED WITH TYPE 2 DIABETES MELLITUS (HCC): Chronic | ICD-10-CM

## 2018-08-27 NOTE — PROGRESS NOTES
[address] [address]  [phone]     Subjective:      Lulu Baez is a 50 y.o. female with pmhx HTN, HLD, DM, SVT 11 yrs ago  is here to establish care and further evaluation of isolated episode of sustained palpitation x 1 hr. Initially presented to the ED 18 c/o for said symptom,  via her pulse ox but slowed down upon ED arrival.  Mild troponin elevation, EKG ST, elevated BG at 536. Denies any further episode of palpitation. Further denies chest pain/ shortness of breath, orthopnea, PND, LE edema, syncope, or presyncope.        Cardiac risk factors: HTN, HLD, DM, age, post menopausal F, elevated BMI, family hx of CAD, former smoker    Hx smoking: former  Alcohol: denies   Illegal drug use: denies    Family hx: mother: CAD, AF  father: N/a  Siblings: n/a    Patient Active Problem List    Diagnosis Date Noted    Type 2 diabetes with nephropathy ([address] 2018    Hyperlipidemia associated with type 2 diabetes mellitus ([address] 2017    Type 2 diabetes mellitus without complication, without long-term current use of insulin ([address] 2017    OCD (obsessive compulsive disorder) 2017      Vinicius Rodriguez III, DO  Past Medical History:   Diagnosis Date    Diabetes ([address]     Menopause     OCD (obsessive compulsive disorder)       Past Surgical History:   Procedure Laterality Date    HX  SECTION       and     HX CHOLECYSTECTOMY      HX OTHER SURGICAL      teeth extracted; has dentures     Allergies   Allergen Reactions    Erythromycin Other (comments)     Severe stomach pains      Family History   Problem Relation Age of Onset   Latrell Cords COPD Mother     Hypertension Mother     Cancer Mother     Hypertension Father     Cancer Father     Cancer Sister     Diabetes Maternal Aunt     Diabetes Maternal Uncle       Social History     Social History    Marital status:      Spouse name: N/A    Number of children: N/A    Years of education: N/A     Occupational History    Not on file. Social History Main Topics    Smoking status: Former Smoker    Smokeless tobacco: Never Used    Alcohol use Yes      Comment: occassionally    Drug use: No    Sexual activity: Yes     Partners: Male     Other Topics Concern    Not on file     Social History Narrative      Current Outpatient Prescriptions   Medication Sig    insulin degludec (TRESIBA FLEXTOUCH U-200) 200 unit/mL (3 mL) inpn INJECT 20 UNITS SUBCUTANEOUSLY  DAILY    metoprolol tartrate (LOPRESSOR) 25 mg tablet Take 0.5 Tabs by mouth two (2) times a day.  atorvastatin (LIPITOR) 20 mg tablet TAKE ONE TABLET BY MOUTH ONCE DAILY    insulin degludec (TRESIBA FLEXTOUCH U-100) 100 unit/mL (3 mL) inpn 20 Units by SubCUTAneous route daily.  sertraline (ZOLOFT) 100 mg tablet Take 1 Tab by mouth daily.  metFORMIN (GLUCOPHAGE) 1,000 mg tablet Take 1 Tab by mouth two (2) times a day. Indications: taking once daily     No current facility-administered medications for this visit. Review of Symptoms:  11 systems reviewed, negative other than as stated in the HPI    Physical ExamPhysical Exam:    Vitals:    08/27/18 0928   Pulse: 91   SpO2: 99%   Weight: 147 lb 14.4 oz (67.1 kg)   Height: 5' 3\" (1.6 m)     Body mass index is 26.2 kg/(m^2). General PE   Gen:  NAD  Mental Status - Alert. General Appearance - Not in acute distress. Chest and Lung Exam   Inspection: Accessory muscles - No use of accessory muscles in breathing. Auscultation:   Breath sounds: - Normal.   Cardiovascular   Inspection: Jugular vein - Bilateral - Inspection Normal.   Palpation/Percussion:   Apical Impulse: - Normal.   Auscultation: Rhythm - Regular. Heart Sounds - S1 WNL and S2 WNL. No S3 or S4. Murmurs & Other Heart Sounds: Auscultation of the heart reveals - No Murmurs.    Peripheral Vascular   Upper Extremity: Inspection - Bilateral - No Cyanotic nailbeds or Digital clubbing. Lower Extremity:   Palpation: Edema - Bilateral - No edema. Abdomen:   Soft, non-tender, bowel sounds are active. Neuro: A&O times 3, CN and motor grossly WNL    Labs:   Lab Results   Component Value Date/Time    Cholesterol, total 176 07/21/2017 08:34 AM    HDL Cholesterol 44 07/21/2017 08:34 AM    LDL, calculated 107 (H) 07/21/2017 08:34 AM    Triglyceride 124 07/21/2017 08:34 AM     Lab Results   Component Value Date/Time    CK 84 08/07/2018 12:16 AM     Lab Results   Component Value Date/Time    Sodium 134 (L) 08/07/2018 12:16 AM    Potassium 4.1 08/07/2018 12:16 AM    Chloride 100 08/07/2018 12:16 AM    CO2 26 08/07/2018 12:16 AM    Anion gap 8 08/07/2018 12:16 AM    Glucose 536 (H) 08/07/2018 12:16 AM    BUN 11 08/07/2018 12:16 AM    Creatinine 1.11 (H) 08/07/2018 12:16 AM    BUN/Creatinine ratio 10 (L) 08/07/2018 12:16 AM    GFR est AA >60 08/07/2018 12:16 AM    GFR est non-AA 52 (L) 08/07/2018 12:16 AM    Calcium 8.7 08/07/2018 12:16 AM    Bilirubin, total 0.3 08/07/2018 12:16 AM    AST (SGOT) 148 (H) 08/07/2018 12:16 AM    Alk. phosphatase 193 (H) 08/07/2018 12:16 AM    Protein, total 7.2 08/07/2018 12:16 AM    Albumin 3.8 08/07/2018 12:16 AM    Globulin 3.4 08/07/2018 12:16 AM    A-G Ratio 1.1 08/07/2018 12:16 AM    ALT (SGPT) 145 (H) 08/07/2018 12:16 AM       EKG:  NSR     Assessment:     Assessment:      1. Palpitation    2. Hyperlipidemia associated with type 2 diabetes mellitus ([address]    3. Type 2 diabetes with nephropathy ([facility]    4. Encounter to establish care        Orders Placed This Encounter    AMB POC EKG ROUTINE W/ 12 LEADS, INTER & REP     Order Specific Question:   Reason for Exam:     Answer:   ROUTINE        Plan:     Pt is a 50 y.o. female with pmhx HTN, HLD, DM, SVT 11 yrs ago  is here to establish care and further evaluation of isolated episode of sustained palpitation x 1 hr.    Initially presented to the ED 8/6/18 c/o for said symptom,  via her pulse ox but slowed down (120) upon ED arrival.  Mild troponin elevation, EKG ST, elevated BG at 536. Denies any further episode of palpitation. Cardiac risk factors: HTN, HLD, DM, age, post menopausal F, elevated BMI, family hx of CAD, former smoker    Palpitation  Hx SVT  Normal TSH/T4 in 8/18. Admits to excessive caffeine intake  Check echo  She will call us if palpitation that is increasing in frequency, intensity or duration recurs and at such time, will pursue further work up. NOT on BB (was given in ED in case she needs it)      HTN  BP controlled    Mildly elevated troponin in ED  Troponin leak s/t tachycardia but given cardiac risk factors, will obtain regular stress test to r/o ischemia      HLD  7/17 LDL at 107. On statin. Lipids and labs followed by PCP      Continue current care and f/u in 1 yr unless testing abnormal      Faina Leiva NP       Pt seen and examined in details. Agree with NP A&P. D/w pt. Palpitation most likely due to excessive caffeine. No further episodes. Workup as above. Use BB as needed. If any more episodes, despite normal work up, then will consider further evaluation.      Shirley Butterfield MD

## 2018-08-27 NOTE — PROGRESS NOTES
Chief Complaint   Patient presents with   [facility] ED Follow-up     PALPITATIONS, SVT     1. Have you been to the ER, urgent care clinic since your last visit? Hospitalized since your last visit? [facility] 8[place]6/18 FOR PALPITATIONS. 2. Have you seen or consulted any other health care providers outside of the [address] [place] [address] since your last visit? Include any pap smears or colon screening.  NO

## 2018-08-27 NOTE — MR AVS SNAPSHOT
[address] 
[phone] Patient: Juwan Ritchie MRN: [id number] CTW:[birth year] Visit Information Date & Time Provider Department Dept. Phone Encounter #  
 8/27/2018  9:30 AM Marion Tipton, [address] [facility] [phone] [id number] Follow-up Instructions Return in about 1 year (around 8/27/2019). Routing History Your Appointments 8/27/2018  1:30 PM  
ROUTINE CARE with NURSE NAVIGATOR3 [facility]) Appt Note: diabetes education with Adilia Sidhu [address]  
[phone]  
  
   
 [address] [facility]  [address] [address]  
  
    
 9/4/2018  9:00 AM  
NUCLEAR MEDICINE with [facility]) Appt Note: Dr. Hugo Herrera 2D ECHO COMPLETE ADULT (TTE) W OR WO CONTR [[id number]] (Order [id number]),IPB  
 [address]  
[phone] [address] Upcoming Health Maintenance Date Due  
 EYE EXAM RETINAL OR DILATED Q1 1/10/1980 Pneumococcal 19-64 Medium Risk (1 of 1 - PPSV23) 1/10/1989 PAP AKA CERVICAL CYTOLOGY 1/10/1991 DTaP/Tdap/Td series (1 - Tdap) 3/11/2011 MICROALBUMIN Q1 7/21/2018 LIPID PANEL Q1 7/21/2018 Influenza Age 5 to Adult 8/1/2018 HEMOGLOBIN A1C Q6M 8/20/2018 FOOT EXAM Q1 2/20/2019 Allergies as of 8/27/2018  Review Complete On: 8/27/2018 By: Marion Tipton MD  
  
 Severity Noted Reaction Type Reactions Erythromycin  07/11/2017    Other (comments) Severe stomach pains Current Immunizations  Never Reviewed Name Date Td 3/10/2011 Not reviewed this visit You Were Diagnosed With   
  
 Codes Comments Palpitation    -  Primary ICD-10-CM: R00.2 ICD-9-CM: 785.1 Hyperlipidemia associated with type 2 diabetes mellitus ([facility]     ICD-10-CM: E11.69, E78.5 ICD-9-CM: 250.80, 272.4 Type 2 diabetes with nephropathy (HCC)     ICD-10-CM: E11.21 
ICD-9-CM: 250.40, 583.81 Encounter to establish care     ICD-10-CM: Z76.89 
ICD-9-CM: V65.8 SVT (supraventricular tachycardia) (HCC)     ICD-10-CM: I47.1 ICD-9-CM: 427.89 Vitals BP Pulse Height(growth percentile) Weight(growth percentile) SpO2 BMI  
 136/86 (BP 1 Location: Right arm, BP Patient Position: Sitting) 91 5' 3\" (1.6 m) 147 lb 14.4 oz (67.1 kg) 99% 26.2 kg/m2 OB Status Smoking Status Menopause Former Smoker Vitals History BMI and BSA Data Body Mass Index Body Surface Area  
 26.2 kg/m 2 1.73 m 2 Preferred Pharmacy Pharmacy Name Phone [facility] [address] [place] [address] [place] [phone] Your Updated Medication List  
  
   
This list is accurate as of 8/27/18 10:21 AM.  Always use your most recent med list.  
  
  
  
  
 atorvastatin 20 mg tablet Commonly known as:  LIPITOR  
TAKE ONE TABLET BY MOUTH ONCE DAILY  
  
 insulin degludec 100 unit/mL (3 mL) Inpn Commonly known as:  TRESIBA FLEXTOUCH U-100  
20 Units by SubCUTAneous route daily. metFORMIN 1,000 mg tablet Commonly known as:  GLUCOPHAGE Take 1 Tab by mouth two (2) times a day. Indications: taking once daily  
  
 metoprolol tartrate 25 mg tablet Commonly known as:  LOPRESSOR Take 0.5 Tabs by mouth two (2) times a day. sertraline 100 mg tablet Commonly known as:  ZOLOFT Take 1 Tab by mouth daily. We Performed the Following 2D ECHO COMPLETE ADULT (TTE) W OR WO CONTR [02163 CPT(R)] AMB POC EKG ROUTINE W/ 12 LEADS, INTER & REP [89664 CPT(R)] Follow-up Instructions Return in about 1 year (around 8/27/2019). To-Do List   
 08/30/2018 ECG:  STRESS TEST CARDIAC Introducing [facility]! Dear Ruben Lowe: Thank you for requesting a [employer] account. Our records indicate that you already have an active [employer] account. You can access your account anytime at [email or web address] Did you know that you can access your hospital and ER discharge instructions at any time in [employer]? You can also review all of your test results from your hospital stay or ER visit. Additional Information If you have questions, please visit the Frequently Asked Questions section of the [employer] website at [email or web address] Remember, [employer] is NOT to be used for urgent needs. For medical emergencies, dial 911. Now available from your iPhone and Android! Please provide this summary of care documentation to your next provider. Your primary care clinician is listed as Simmie [place] If you have any questions after today's visit, please call [phone].

## 2018-08-30 ENCOUNTER — PATIENT OUTREACH (OUTPATIENT)
Dept: INTERNAL MEDICINE CLINIC | Age: 48
End: 2018-08-30

## 2018-08-30 NOTE — PROGRESS NOTES
Pt missed appointment for diabetes self management education on 8/27/18. Lab Results Component Value Date/Time Hemoglobin A1c 11.3 (H) 07/21/2017 08:34 AM  
 
Pt has an appt with cardiology the same day. Pt stated her blood sugars are much better, always <150 and  fasting. Pt has cut out sugary drinks and watching carbs. Pt will call back to schedule an appt.

## 2018-09-04 ENCOUNTER — CLINICAL SUPPORT (OUTPATIENT)
Dept: CARDIOLOGY CLINIC | Age: 48
End: 2018-09-04

## 2018-09-04 DIAGNOSIS — R00.2 PALPITATIONS: Primary | ICD-10-CM

## 2018-09-05 ENCOUNTER — TELEPHONE (OUTPATIENT)
Dept: CARDIOLOGY CLINIC | Age: 48
End: 2018-09-05

## 2018-09-05 NOTE — TELEPHONE ENCOUNTER
----- Message from Glo Carvaloh MD sent at 9/4/2018  4:31 PM EDT -----  Inform her Echo is k      Called pt,verified pt with two pt identifiers, told pt her echo is normal. Pt verbalized understanding.

## 2018-09-07 ENCOUNTER — CLINICAL SUPPORT (OUTPATIENT)
Dept: CARDIOLOGY CLINIC | Age: 48
End: 2018-09-07

## 2018-09-07 DIAGNOSIS — I47.1 SVT (SUPRAVENTRICULAR TACHYCARDIA) (HCC): ICD-10-CM

## 2018-09-07 DIAGNOSIS — R00.2 PALPITATION: ICD-10-CM

## 2018-09-07 DIAGNOSIS — E11.69 HYPERLIPIDEMIA ASSOCIATED WITH TYPE 2 DIABETES MELLITUS (HCC): Chronic | ICD-10-CM

## 2018-09-07 DIAGNOSIS — Z76.89 ENCOUNTER TO ESTABLISH CARE: ICD-10-CM

## 2018-09-07 DIAGNOSIS — E11.21 TYPE 2 DIABETES WITH NEPHROPATHY (HCC): Chronic | ICD-10-CM

## 2018-09-07 DIAGNOSIS — E78.5 HYPERLIPIDEMIA ASSOCIATED WITH TYPE 2 DIABETES MELLITUS (HCC): Chronic | ICD-10-CM

## 2018-09-10 ENCOUNTER — PATIENT OUTREACH (OUTPATIENT)
Dept: OTHER | Age: 48
End: 2018-09-10

## 2018-09-12 ENCOUNTER — TELEPHONE (OUTPATIENT)
Dept: CARDIOLOGY CLINIC | Age: 48
End: 2018-09-12

## 2018-10-01 ENCOUNTER — PATIENT OUTREACH (OUTPATIENT)
Dept: OTHER | Age: 48
End: 2018-10-01

## 2018-10-01 NOTE — LETTER
10/1/2018 1:53 PM 
 
Ms. Kathy Mcgarry [address] [facility] Dear Heide Mullen, My name is Edna Holland RN, Employee Care Manager for [employer], and I have been trying to reach you. The Employee Care Management [facility]) program is a free-of-charge, confidential service provided to our employees and their family members covered by the [employer]. I can help you with care transitions such as when you come home from the hospital, when help is needed to manage your disease, or when you need assistance coordinating services or appointments. [employer] now partners with [facility]. If you are a qualifying employee, you may receive an additional 10 wellness incentive points for every month of active participation with an Employee Care Manager. As healthcare providers, we know that patients do better when they have close follow up with a primary care provider (PCP). I can help you find one that is convenient to you and covered by your insurance. I can also help you understand any after visit instructions, such as what symptoms to watch out for, or any new or changed medications. We can work together using your preferred communication -- telephone, email, [employer]. If you do not have a [employer] account, I can help you request access. Our program is designed to provide you with the opportunity to have a [employer] [facility] care manager partner with you for your healthcare needs. Due to not being able to reach you, I am closing out the current program, but will remain available to you should you have any questions. Please contact me at the below number if I can provide you with assistance for any of the above services. Sincerely, 
 
 
 
 
Edna Holland RN, MSN, [employer] Employee Care Manager [address] [place] [address] [address] [facility] Cell [phone]  Fax [email or web address]

## 2018-10-03 ENCOUNTER — PATIENT OUTREACH (OUTPATIENT)
Dept: INTERNAL MEDICINE CLINIC | Age: 48
End: 2018-10-03

## 2018-10-03 NOTE — PROGRESS NOTES
Chart reviewed. Reached out to pt regarding diabetes self management. Pt is due for an A1c at appt tomorrow with Dr. Carolann Hammonds. Pt will f/u with this CDE pending A1c result tomorrow. 10.7% on 2/20/18 Lab Results Component Value Date/Time Hemoglobin A1c 11.3 (H) 07/21/2017 08:34 AM  
 
Key Antihyperglycemic Medications   
    
  
 insulin degludec (TRESIBA FLEXTOUCH U-100) 100 unit/mL (3 mL) inpn 20 Units by SubCUTAneous route daily. metFORMIN (GLUCOPHAGE) 1,000 mg tablet Take 1 Tab by mouth two (2) times a day. Indications: taking once daily Future Appointments Date Time Provider Jose M Ivan 10/4/2018 11:45 AM Britney 60 Last Appointment My Department: 
8/27/2018 Patient has graduated from the Disease Specific Care Management  program on 10/5/18 for diabetes. Patient's symptoms are stable at this time. Patient/family has the ability to self-manage. Care management goals have been completed at this time. No further nurse navigator follow up scheduled. Goals Addressed Most Recent  Diabetes self management (pt-stated)   On track (10/3/2018) 10/5/18- 
- pt seen 10/4/18 for f/u by PCP 
- A1c down to 8.1% 
- next appt with PCP due April 2019 
- disease specific care mgmt episode resolved DKW 
 
10/3/18-DKW 
-pt stated is doing well and on track 
-has appt w/Dr. Carolann Hammonds tomorrow 10/4/18 at which time she will f/u pending A1c result. 7/26/17- 
Goals: 
 
1) incorporate whole wheat and grains into family meals starting next week 2) sweets- choose sugar free alternatives starting now. DW 
  
  Patient verbalizes understanding of self -management goals of living with Diabetes. On track (10/3/2018) Patient will have a better understanding of monitoring blood glucose and potential side effects of elevated blood glucose. 10/5/18- 
- pt seen 10/4/18 for f/u by PCP 
- A1c down to 8.1% - next appt with PCP due April 2019 
- disease specific care mgmt episode resolved DKW 
 
10/3/18-DKW - pt stated her 7 day blood sugar average is 156; this AM was 92; pt has appt tomorrow 10/4/18 with Dr. Noemi Gann and anticipates A1c to be down in the 7s. Will follow prn pending A1c results tomorrow. Pt has nurse navigator's contact information for any further questions, concerns, or needs. Patients upcoming visits:  No future appointments.

## 2018-10-04 ENCOUNTER — OFFICE VISIT (OUTPATIENT)
Dept: INTERNAL MEDICINE CLINIC | Age: 48
End: 2018-10-04

## 2018-10-04 VITALS
OXYGEN SATURATION: 98 % | SYSTOLIC BLOOD PRESSURE: 120 MMHG | RESPIRATION RATE: 16 BRPM | TEMPERATURE: 98.3 F | HEIGHT: 63 IN | HEART RATE: 86 BPM | WEIGHT: 145 LBS | DIASTOLIC BLOOD PRESSURE: 83 MMHG | BODY MASS INDEX: 25.69 KG/M2

## 2018-10-04 DIAGNOSIS — Z23 ENCOUNTER FOR IMMUNIZATION: ICD-10-CM

## 2018-10-04 DIAGNOSIS — E78.5 HYPERLIPIDEMIA ASSOCIATED WITH TYPE 2 DIABETES MELLITUS (HCC): Chronic | ICD-10-CM

## 2018-10-04 DIAGNOSIS — I47.1 PAROXYSMAL SVT (SUPRAVENTRICULAR TACHYCARDIA) (HCC): Chronic | ICD-10-CM

## 2018-10-04 DIAGNOSIS — F42.9 OBSESSIVE-COMPULSIVE DISORDER, UNSPECIFIED TYPE: Chronic | ICD-10-CM

## 2018-10-04 DIAGNOSIS — E11.21 TYPE 2 DIABETES WITH NEPHROPATHY (HCC): Primary | Chronic | ICD-10-CM

## 2018-10-04 DIAGNOSIS — E11.69 HYPERLIPIDEMIA ASSOCIATED WITH TYPE 2 DIABETES MELLITUS (HCC): Chronic | ICD-10-CM

## 2018-10-04 PROBLEM — E11.9 TYPE 2 DIABETES MELLITUS WITHOUT COMPLICATION, WITHOUT LONG-TERM CURRENT USE OF INSULIN (HCC): Chronic | Status: RESOLVED | Noted: 2017-07-11 | Resolved: 2018-10-04

## 2018-10-04 LAB — HBA1C MFR BLD HPLC: 8.1 %

## 2018-10-04 RX ORDER — INSULIN DEGLUDEC INJECTION 200 U/ML
INJECTION, SOLUTION SUBCUTANEOUS
COMMUNITY
Start: 2018-08-13 | End: 2018-10-04 | Stop reason: SDUPTHER

## 2018-10-04 NOTE — PROGRESS NOTES
Reviewed record in preparation for visit and have obtained necessary documentation. Identified pt with two pt identifiers(name and ). Chief Complaint Patient presents with  Diabetes  
  follow up Health Maintenance Due Topic Date Due  
 EYE EXAM RETINAL OR DILATED Q1  01/10/1980  Pneumococcal 19-64 Medium Risk (1 of 1 - PPSV23) 01/10/1989  PAP AKA CERVICAL CYTOLOGY  01/10/1991  
 DTaP/Tdap/Td series (1 - Tdap) 2011  MICROALBUMIN Q1  2018  LIPID PANEL Q1  2018  Influenza Age 5 to Adult  2018  HEMOGLOBIN A1C Q6M  2018 Ms. Ricco Powell has a reminder for a \"due or due soon\" health maintenance. I have asked that she discuss health maintenance topic(s) due with Her  primary care provider. Coordination of Care Questionnaire: 
:  
 
1) Have you been to an emergency room, urgent care clinic since your last visit? no  
Hospitalized since your last visit? no          
 
2) Have you seen or consulted any other health care providers outside of [address] [place] [address] since your last visit? no  (Include any pap smears or colon screenings in this section.) 3) Do you have an Advance Directive on file? no 
 
4) Are you interested in receiving information on Advance Directives? NO Patient is accompanied by self I have received verbal consent from Brannon Thompson to discuss any/all medical information while they are present in the room.

## 2018-10-04 NOTE — PROGRESS NOTES
Mily Wall is a 50 y.o. female who presents for evaluation of routine follow up. Last seen by me feb 20, 2018. 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 Started there in may. Had a bout of SVT in august, saw cardio, workup was negative. Has not had any more spells. Has metoprolol to use prn. States sugars are much improved over past few months. ROS: 
Constitutional: negative for fevers, chills, anorexia and weight loss Eyes:   negative for visual disturbance and irritation ENT:   negative for tinnitus,sore throat,nasal congestion,ear pain,hoarseness Respiratory:  negative for cough, hemoptysis, dyspnea,wheezing CV:   negative for chest pain, palpitations, lower extremity edema GI:   negative for nausea, vomiting, diarrhea, abdominal pain,melena Genitourinary: negative for frequency, dysuria and hematuria Musculoskel: negative for myalgias, arthralgias, back pain, muscle weakness, joint pain Neurological:  negative for headaches, dizziness, focal weakness, numbness Psychiatric:     Negative for depression or anxiety Past Medical History:  
Diagnosis Date  Diabetes ([facility]  Menopause 2014  OCD (obsessive compulsive disorder) Past Surgical History:  
Procedure Laterality Date [address] [facility] [address]  HX OTHER SURGICAL    
 teeth extracted; has dentures Family History Problem Relation Age of Onset  COPD Mother  Hypertension Mother  Cancer Mother  Hypertension Father  Cancer Father  Cancer Sister  Diabetes Maternal Aunt  Diabetes Maternal Uncle Social History Social History  Marital status:  Spouse name: N/A  
 Number of children: N/A  
 Years of education: N/A Occupational History  Not on file. Social History Main Topics  Smoking status: Former Smoker  Smokeless tobacco: Never Used  Alcohol use Yes Comment: occassionally  Drug use: No  
 Sexual activity: Yes  
  Partners: Male Other Topics Concern  Not on file Social History Narrative Visit Vitals  /83 (BP 1 Location: Left arm, BP Patient Position: Sitting)  Pulse 86  Temp 98.3 °F (36.8 °C) (Oral)  Resp 16  
 Ht 5' 3\" (1.6 m)  Wt 145 lb (65.8 kg)  SpO2 98%  BMI 25.69 kg/m2 Physical Examination:  
General - Well appearing female HEENT - PERRL, TM no erythema/opacification, normal nasal turbinates, no oropharyngeal erythema or exudate, MMM Neck - supple, no bruits, no thyroidomegaly, no lymphadenopathy Pulm - clear to auscultation bilaterally Cardio - RRR, normal S1 S2, no murmur Abd - soft, nontender, no masses, no HSM Extrem - no edema, +2 distal pulses Neuro-  No focal deficits, CN intact Assessment/Plan: 1. Uncontrolled dm, type 2--on tresiba, glucophage. Last a1c 10.7, check again 2.  hyperlipids--on lipitor, check flp 3.  ocd--continue zoloft 4. Paroxysmal svt--prn metoprolol 5. Routine adult health maintenance--flu shot given today. Reminded to have pap/pelvic and mamm done. States had pneumovax 23 a few years ago. rtc 6 months Hardeep Schuster III, DO

## 2018-10-04 NOTE — PATIENT INSTRUCTIONS

## 2018-10-04 NOTE — MR AVS SNAPSHOT
[address] [facility] [address] [facility] [address] [place] [facility] [place] [address]. 
[phone] Patient: Que Garcia MRN: [id number] KLF:[id number] Visit Information Date & Time Provider Department Dept. Phone Encounter #  
 10/4/2018 11:45 AM [place] , [facility] [phone] [id number] Follow-up Instructions Return in about 6 months (around 4/4/2019). Upcoming Health Maintenance Date Due  
 EYE EXAM RETINAL OR DILATED Q1 1/10/1980 Pneumococcal 19-64 Medium Risk (1 of 1 - PPSV23) 1/10/1989 PAP AKA CERVICAL CYTOLOGY 1/10/1991 DTaP/Tdap/Td series (1 - Tdap) 3/11/2011 MICROALBUMIN Q1 7/21/2018 LIPID PANEL Q1 7/21/2018 Influenza Age 5 to Adult 8/1/2018 HEMOGLOBIN A1C Q6M 8/20/2018 FOOT EXAM Q1 2/20/2019 Allergies as of 10/4/2018  Review Complete On: 10/4/2018 By: Rena Montaño III, DO Severity Noted Reaction Type Reactions Erythromycin  07/11/2017    Other (comments) Severe stomach pains Current Immunizations  Never Reviewed Name Date Td 3/10/2011 Not reviewed this visit You Were Diagnosed With   
  
 Codes Comments Type 2 diabetes with nephropathy (HCC)    -  Primary ICD-10-CM: E11.21 
ICD-9-CM: 250.40, 583.81 Hyperlipidemia associated with type 2 diabetes mellitus ([facility] [address]     ICD-10-CM: E11.69, E78.5 ICD-9-CM: 250.80, 272.4 Obsessive-compulsive disorder, unspecified type     ICD-10-CM: F42.9 ICD-9-CM: 300.3 Paroxysmal SVT (supraventricular tachycardia) (HCC)     ICD-10-CM: I47.1 ICD-9-CM: 427.0 Vitals BP Pulse Temp Resp Height(growth percentile) Weight(growth percentile) 120/83 (BP 1 Location: Left arm, BP Patient Position: Sitting) 86 98.3 °F (36.8 °C) (Oral) 16 5' 3\" (1.6 m) 145 lb (65.8 kg) SpO2 BMI OB Status Smoking Status 98% 25.69 kg/m2 Menopause Former Smoker Vitals History BMI and BSA Data Body Mass Index Body Surface Area  
 25.69 kg/m 2 1.71 m 2 Preferred Pharmacy Pharmacy Name Phone [facility] [address] [phone] Your Updated Medication List  
  
   
This list is accurate as of 10/4/18 12:18 PM.  Always use your most recent med list.  
  
  
  
  
 atorvastatin 20 mg tablet Commonly known as:  LIPITOR  
TAKE ONE TABLET BY MOUTH ONCE DAILY  
  
 insulin degludec 100 unit/mL (3 mL) Inpn Commonly known as:  TRESIBA FLEXTOUCH U-100  
20 Units by SubCUTAneous route daily. metFORMIN 1,000 mg tablet Commonly known as:  GLUCOPHAGE Take 1 Tab by mouth two (2) times a day. Indications: taking once daily  
  
 metoprolol tartrate 25 mg tablet Commonly known as:  LOPRESSOR Take 0.5 Tabs by mouth two (2) times a day. sertraline 100 mg tablet Commonly known as:  ZOLOFT Take 1 Tab by mouth daily. We Performed the Following AMB POC HEMOGLOBIN A1C [90924 CPT(R)] LIPID PANEL [44823 CPT(R)] MICROALBUMIN, UR, RAND W/ MICROALB/CREAT RATIO [id number] CPT(R)] Follow-up Instructions Return in about 6 months (around 4/4/2019). Patient Instructions Learning About Diabetes Food Guidelines Your Care Instructions Meal planning is important to manage diabetes. It helps keep your blood sugar at a target level (which you set with your doctor). You don't have to eat special foods. You can eat what your family eats, including sweets once in a while. But you do have to pay attention to how often you eat and how much you eat of certain foods. You may want to work with a dietitian or a certified diabetes educator (CDE) to help you plan meals and snacks. A dietitian or CDE can also help you lose weight if that is one of your goals. What should you know about eating carbs?  
Managing the amount of carbohydrate (carbs) you eat is an important part of healthy meals when you have diabetes. Carbohydrate is found in many foods. · Learn which foods have carbs. And learn the amounts of carbs in different foods. ¨ Bread, cereal, pasta, and rice have about 15 grams of carbs in a serving. A serving is 1 slice of bread (1 ounce), ½ cup of cooked cereal, or 1/3 cup of cooked pasta or rice. ¨ Fruits have 15 grams of carbs in a serving. A serving is 1 small fresh fruit, such as an apple or orange; ½ of a banana; ½ cup of cooked or canned fruit; ½ cup of fruit juice; 1 cup of melon or raspberries; or 2 tablespoons of dried fruit. ¨ Milk and no-sugar-added yogurt have 15 grams of carbs in a serving. A serving is 1 cup of milk or 2/3 cup of no-sugar-added yogurt. ¨ Starchy vegetables have 15 grams of carbs in a serving. A serving is ½ cup of mashed potatoes or sweet potato; 1 cup winter squash; ½ of a small baked potato; ½ cup of cooked beans; or ½ cup cooked corn or green peas. · Learn how much carbs to eat each day and at each meal. A dietitian or CDE can teach you how to keep track of the amount of carbs you eat. This is called carbohydrate counting. · If you are not sure how to count carbohydrate grams, use the Plate Method to plan meals. It is a good, quick way to make sure that you have a balanced meal. It also helps you spread carbs throughout the day. ¨ Divide your plate by types of foods. Put non-starchy vegetables on half the plate, meat or other protein food on one-quarter of the plate, and a grain or starchy vegetable in the final quarter of the plate. To this you can add a small piece of fruit and 1 cup of milk or yogurt, depending on how many carbs you are supposed to eat at a meal. 
· Try to eat about the same amount of carbs at each meal. Do not \"save up\" your daily allowance of carbs to eat at one meal. 
· Proteins have very little or no carbs per serving.  Examples of proteins are beef, chicken, turkey, fish, eggs, tofu, cheese, cottage cheese, and peanut butter. A serving size of meat is 3 ounces, which is about the size of a deck of cards. Examples of meat substitute serving sizes (equal to 1 ounce of meat) are 1/4 cup of cottage cheese, 1 egg, 1 tablespoon of peanut butter, and ½ cup of tofu. How can you eat out and still eat healthy? · Learn to estimate the serving sizes of foods that have carbohydrate. If you measure food at home, it will be easier to estimate the amount in a serving of restaurant food. · If the meal you order has too much carbohydrate (such as potatoes, corn, or baked beans), ask to have a low-carbohydrate food instead. Ask for a salad or green vegetables. · If you use insulin, check your blood sugar before and after eating out to help you plan how much to eat in the future. · If you eat more carbohydrate at a meal than you had planned, take a walk or do other exercise. This will help lower your blood sugar. What else should you know? · Limit saturated fat, such as the fat from meat and dairy products. This is a healthy choice because people who have diabetes are at higher risk of heart disease. So choose lean cuts of meat and nonfat or low-fat dairy products. Use olive or canola oil instead of butter or shortening when cooking. · Don't skip meals. Your blood sugar may drop too low if you skip meals and take insulin or certain medicines for diabetes. · Check with your doctor before you drink alcohol. Alcohol can cause your blood sugar to drop too low. Alcohol can also cause a bad reaction if you take certain diabetes medicines. Follow-up care is a key part of your treatment and safety. Be sure to make and go to all appointments, and call your doctor if you are having problems. It's also a good idea to know your test results and keep a list of the medicines you take. Where can you learn more? Go to http://delon-frances.info/.  
Enter L540 in the search box to learn more about \"Learning About Diabetes Food Guidelines. \" Current as of: December 7, 2017 Content Version: 11.8 © 8609-6513 Healthwise, Thing[employer]. Care instructions adapted under license by [employer] (which disclaims liability or warranty for this information). If you have questions about a medical condition or this instruction, always ask your healthcare professional. [address] any warranty or liability for your use of this information. Try to get pap and pelvic and mammogram done in next few months. Introducing [facility]! Dear Елена Coe: Thank you for requesting a [employer] account. Our records indicate that you already have an active [employer] M[employer] account. You can access your account anytime at [email or web address] Did you know that you can access your hospital and ER discharge instructions at any time in IT MOVES IT? You can also review all of your test results from your hospital stay or ER visit. Additional Information If you have questions, please visit the Frequently Asked Questions section of the IT MOVES IT website at [email or web address] Remember, IT MOVES IT is NOT to be used for urgent needs. For medical emergencies, dial 911. Now available from your iPhone and Android! Please provide this summary of care documentation to your next provider. Your primary care clinician is listed as Joe Diaz If you have any questions after today's visit, please call [phone].

## 2018-10-08 RX ORDER — SERTRALINE HYDROCHLORIDE 100 MG/1
100 TABLET, FILM COATED ORAL DAILY
Qty: 90 TAB | Refills: 3 | Status: SHIPPED | OUTPATIENT
Start: 2018-10-08 | End: 2019-10-28 | Stop reason: SDUPTHER

## 2018-10-08 NOTE — TELEPHONE ENCOUNTER
From: Leatha Mcgarry  To:  Milvia Santos III, DO  Sent: 10/8/2018 8:59 AM EDT  Subject: Medication Renewal Request    Original authorizing provider: DO Leatha Miller III would like a refill of the following medications:  sertraline (ZOLOFT) 100 mg tablet Milvia Santos III, DO]    Preferred pharmacy: [address] [place] [address], [facility] [address]:

## 2018-10-31 ENCOUNTER — TELEPHONE (OUTPATIENT)
Dept: INTERNAL MEDICINE CLINIC | Age: 48
End: 2018-10-31

## 2018-10-31 NOTE — TELEPHONE ENCOUNTER
#[phone] pt needs proof of flu vaccine today as she works at [id number] [employer]. Please print out and put at the  for pt as soon as possible. She will be over to pick this up. Thanks. Who ever can do this faster.

## 2019-09-10 DIAGNOSIS — E11.9 TYPE 2 DIABETES MELLITUS WITHOUT COMPLICATION, WITHOUT LONG-TERM CURRENT USE OF INSULIN (HCC): Chronic | ICD-10-CM

## 2019-09-10 RX ORDER — INSULIN DEGLUDEC 100 U/ML
INJECTION, SOLUTION SUBCUTANEOUS
Qty: 2 PEN | Refills: 0 | Status: CANCELLED | OUTPATIENT
Start: 2019-09-10

## 2019-09-12 RX ORDER — INSULIN DEGLUDEC 100 U/ML
INJECTION, SOLUTION SUBCUTANEOUS
Qty: 1 PEN | Refills: 0 | Status: SHIPPED | COMMUNITY
Start: 2019-09-12 | End: 2019-10-08 | Stop reason: SDUPTHER

## 2019-10-07 ENCOUNTER — TELEPHONE (OUTPATIENT)
Dept: INTERNAL MEDICINE CLINIC | Age: 49
End: 2019-10-07

## 2019-10-07 NOTE — TELEPHONE ENCOUNTER
Pt states her insulin is out. Pt's insurance no longer pays for. This is not formula tory      Pt needs a different type of insulin that insurance will pay for or samples today as pt is out she states again.       #[phone]

## 2019-10-07 NOTE — TELEPHONE ENCOUNTER
Called, spoke to pt. Two identifiers confirmed. Notified pt to contact insurance company to find out what formulary is covered by insurance. Pt verbalized understanding of information discussed w/ no further questions at this time.

## 2019-10-08 ENCOUNTER — DOCUMENTATION ONLY (OUTPATIENT)
Dept: INTERNAL MEDICINE CLINIC | Age: 49
End: 2019-10-08

## 2019-10-08 DIAGNOSIS — E11.9 TYPE 2 DIABETES MELLITUS WITHOUT COMPLICATION, WITHOUT LONG-TERM CURRENT USE OF INSULIN (HCC): Primary | ICD-10-CM

## 2019-10-08 RX ORDER — INSULIN DEGLUDEC 100 U/ML
INJECTION, SOLUTION SUBCUTANEOUS
Qty: 1 ADJUSTABLE DOSE PRE-FILLED PEN SYRINGE | Refills: 0 | Status: SHIPPED | COMMUNITY
Start: 2019-10-08 | End: 2021-03-16

## 2019-10-08 NOTE — PROGRESS NOTES
Pharmacy Progress Note     Ms. Darrell Gutiérrez stopped by to  her P[place] Svetlana sample today.    Thank you for the consult,  Elvia Borjas, PharmD, CDE

## 2019-10-28 ENCOUNTER — OFFICE VISIT (OUTPATIENT)
Dept: INTERNAL MEDICINE CLINIC | Age: 49
End: 2019-10-28

## 2019-10-28 VITALS
SYSTOLIC BLOOD PRESSURE: 138 MMHG | DIASTOLIC BLOOD PRESSURE: 82 MMHG | RESPIRATION RATE: 14 BRPM | WEIGHT: 150.4 LBS | OXYGEN SATURATION: 100 % | BODY MASS INDEX: 26.65 KG/M2 | HEIGHT: 63 IN | HEART RATE: 95 BPM | TEMPERATURE: 98 F

## 2019-10-28 DIAGNOSIS — E11.69 HYPERLIPIDEMIA ASSOCIATED WITH TYPE 2 DIABETES MELLITUS (HCC): ICD-10-CM

## 2019-10-28 DIAGNOSIS — Z00.00 ROUTINE ADULT HEALTH MAINTENANCE: Primary | ICD-10-CM

## 2019-10-28 DIAGNOSIS — Z23 ENCOUNTER FOR IMMUNIZATION: ICD-10-CM

## 2019-10-28 DIAGNOSIS — E11.21 TYPE 2 DIABETES WITH NEPHROPATHY (HCC): Primary | ICD-10-CM

## 2019-10-28 DIAGNOSIS — I47.1 PAROXYSMAL SVT (SUPRAVENTRICULAR TACHYCARDIA) (HCC): ICD-10-CM

## 2019-10-28 DIAGNOSIS — E11.21 TYPE 2 DIABETES WITH NEPHROPATHY (HCC): ICD-10-CM

## 2019-10-28 DIAGNOSIS — F42.9 OBSESSIVE-COMPULSIVE DISORDER, UNSPECIFIED TYPE: ICD-10-CM

## 2019-10-28 DIAGNOSIS — E78.5 HYPERLIPIDEMIA ASSOCIATED WITH TYPE 2 DIABETES MELLITUS (HCC): ICD-10-CM

## 2019-10-28 RX ORDER — METFORMIN HYDROCHLORIDE 1000 MG/1
TABLET ORAL
Qty: 60 TAB | Refills: 11 | Status: SHIPPED | OUTPATIENT
Start: 2019-10-28 | End: 2021-03-12

## 2019-10-28 RX ORDER — SERTRALINE HYDROCHLORIDE 100 MG/1
TABLET, FILM COATED ORAL
Qty: 30 TAB | Refills: 11 | Status: SHIPPED | OUTPATIENT
Start: 2019-10-28 | End: 2021-02-19

## 2019-10-28 RX ORDER — INSULIN GLARGINE 100 [IU]/ML
INJECTION, SOLUTION SUBCUTANEOUS
Qty: 3 VIAL | Refills: 4 | Status: SHIPPED | OUTPATIENT
Start: 2019-10-28 | End: 2022-08-15 | Stop reason: ALTCHOICE

## 2019-10-28 NOTE — PATIENT INSTRUCTIONS
Office Policies    Phone calls/patient messages:            Please allow up to 24 hours for someone in the office to contact you about your call or message. Be mindful your provider may be out of the office or your message may require further review. We encourage you to use [employer] for your messages as this is a faster, more efficient way to communicate with our office                         Medication Refills:            Prescription medications require 48-72 business hours to process. We encourage you to use [employer] for your refills. For controlled medications: Please allow 72 business hours to process. Certain medications may require you to  a written prescription at our office. NO narcotic/controlled medications will be prescribed after 4pm Monday through Friday or on weekends              Form/Paperwork Completion:            Please note a $25 fee may incur for all paperwork for completed by our providers. We ask that you allow 7-10 business days. Pre-payment is due prior to picking up/faxing the completed form. You may also download your forms to [employer] to have your doctor print off. Learning About Tests When You Have Diabetes  Why do you need regular diabetes tests? Diabetes can be hard on your body if it's not well controlled. But having tests on a regular schedule can help you and your doctor find problems early, when it's easier to start managing them. What tests do you need? The tests you may have, how often you should have them, and the goals of the tests are:  A1c blood test. This test shows the average level of blood sugar over the past 2 to 3 months. It helps your doctor see whether blood sugar levels have been staying within your target range. · How often: Every 3 to 6 months  · Goal: A blood sugar level in your target range  Blood pressure test: This test measures the pressure of blood flow in the arteries.  Controlling blood pressure can help prevent damage to nerves and blood vessels. · How often: Every 3 to 6 months  · Goal: A blood pressure level in your target range  Cholesterol test: This test measures the amount of a type of fat in the blood. It is common for people with diabetes to also have high cholesterol. Too much cholesterol in the blood can build up inside the blood vessels and raise the risk for heart attack and stroke. · How often: At the time of your diabetes diagnosis, and as often as your doctor recommends after that  · Goal: A cholesterol level in your target range  Albumin-creatinine ratio test: This test checks for kidney damage by looking for the protein albumin (say \"al-BYOO-lan\") in the urine. Albumin is normally found in the blood. Kidney damage can let small amounts of it (microalbumin) leak into the urine. · How often: Once a year  · Goal: No protein in the urine  Blood creatinine test/estimated glomerular filtration (eGFR): The blood creatinine (say \"fxiv-JU-ey-neen\") level shows how well your kidneys are working. Creatinine is a waste product that muscles release into the blood. Blood creatinine is used to estimate the glomerular filtration rate. A high level of creatinine and/or a low eGFR may mean your kidneys are not working as well as they should. · How often: Once a year  · Goal: Normal level of creatinine in the blood. The eGFR goal is greater than 60 mL/min/1.73 m². Complete foot exam: The doctor checks for foot sores and whether any sensation has been lost.  · How often: Once a year  · Goal: Healthy feet with no foot ulcers or loss of feeling  Dental exam and cleaning: The dentist checks for gum disease and tooth decay. People with high blood sugar are more likely to have these problems. · How often: Every 6 months  · Goal: Healthy teeth and gums  Complete eye exam: High blood sugar levels can damage the eyes.  This exam is done by an ophthalmologist or optometrist. It includes a dilated eye exam. The exam shows whether there's damage to the back of the eye (diabetic retinopathy). · How often: Once a year. If you don't have any signs of diabetic retinopathy, your doctor may recommend an exam every 2 years. · Goal: No damage to the back of the eye  Thyroid-stimulating hormone (TSH) blood test: This test checks for thyroid disease. Too little thyroid hormone can cause some medicines (like insulin) to stay in the body longer. This can cause low blood sugar. You may be tested if you have high cholesterol or are a woman over 48years old. · How often: As part of your diabetes diagnosis, and as often as your doctor recommends after that  · Goal: Normal level of TSH in the blood  Follow-up care is a key part of your treatment and safety. Be sure to make and go to all appointments, and call your doctor if you are having problems. It's also a good idea to know your test results and keep a list of the medicines you take. Where can you learn more? Go to http://delon-frances.info/. Enter 01.14.46.38.08 in the search box to learn more about \"Learning About Tests When You Have Diabetes. \"  Current as of: April 16, 2019  Content Version: 12.2  © 8061-8427 [employer]. Care instructions adapted under license by [employer] (which disclaims liability or warranty for this information). If you have questions about a medical condition or this instruction, always ask your healthcare professional. [place] [address] any warranty or liability for your use of this information.

## 2019-10-28 NOTE — PROGRESS NOTES
Reviewed record in preparation for visit and have obtained necessary documentation. Identified pt with two pt identifiers(name and ). Chief Complaint   Patient presents with    Diabetes    Cholesterol Problem       Health Maintenance Due   Topic Date Due    Pneumococcal 0-64 years (1 of 1 - PPSV23) 01/10/1976    EYE EXAM RETINAL OR DILATED  01/10/1980    PAP AKA CERVICAL CYTOLOGY  01/10/1991    DTaP/Tdap/Td series (1 - Tdap) 2011    MICROALBUMIN Q1  2018    LIPID PANEL Q1  2018    FOOT EXAM Q1  2019    HEMOGLOBIN A1C Q6M  2019    Influenza Age 5 to Adult  2019       Ms. Joseph Hutton has a reminder for a \"due or due soon\" health maintenance. I have asked that she discuss health maintenance topic(s) due with Her  primary care provider. Coordination of Care Questionnaire:  :     1) Have you been to an emergency room, urgent care clinic since your last visit? no   Hospitalized since your last visit? no             2) Have you seen or consulted any other health care providers outside of [address] [place] [address] since your last visit? no  (Include any pap smears or colon screenings in this section.)    3) Do you have an Advance Directive on file? no    4) Are you interested in receiving information on Advance Directives? NO    Patient is accompanied by self I have received verbal consent from Jaylexie Cervantes to discuss any/all medical information while they are present in the room.

## 2019-10-28 NOTE — PROGRESS NOTES
Tano Almendarez is a 52 y.o. female who presents for evaluation of cpe. Last seen by me oct 4, 2018. Has been a rough year, as her dad  a few months ago. She just had a fishing hook tattoo placed on her chest above her heart. Admits to not doing as well with her diabetes. 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 Her mom, mumtaz miller, is also my patient, and struggling with resp failure and copd.       ROS:  Constitutional: negative for fevers, chills, anorexia and weight loss  Eyes:   negative for visual disturbance and irritation  ENT:   negative for tinnitus,sore throat,nasal congestion,ear pain,hoarseness  Respiratory:  negative for cough, hemoptysis, dyspnea,wheezing  CV:   negative for chest pain, palpitations, lower extremity edema  GI:   negative for nausea, vomiting, diarrhea, abdominal pain,melena  Genitourinary: negative for frequency, dysuria and hematuria  Musculoskel: negative for myalgias, arthralgias, back pain, muscle weakness, joint pain  Neurological:  negative for headaches, dizziness, focal weakness, numbness  Psychiatric:     Negative for depression or anxiety      Past Medical History:   Diagnosis Date    Diabetes ([facility] [address]     Hypercholesterolemia     Menopause     OCD (obsessive compulsive disorder)        Past Surgical History:   Procedure Laterality Date    HX  SECTION       and     HX CHOLECYSTECTOMY      HX OTHER SURGICAL      teeth extracted; has dentures       Family History   Problem Relation Age of Onset    COPD Mother     Hypertension Mother     Cancer Mother     Hypertension Father     Cancer Father     Cancer Sister     Diabetes Maternal Aunt     Diabetes Maternal Uncle        Social History     Socioeconomic History    Marital status:      Spouse name: Not on file    Number of children: Not on file    Years of education: Not on file    Highest education level: Not on file   Occupational History    Not on file Social Needs    Financial resource strain: Not on file    Food insecurity:     Worry: Not on file     Inability: Not on file    Transportation needs:     Medical: Not on file     Non-medical: Not on file   Tobacco Use    Smoking status: Former Smoker    Smokeless tobacco: Never Used   Substance and Sexual Activity    Alcohol use: Yes     Comment: occassionally    Drug use: No     Types: Prescription, OTC    Sexual activity: Yes     Partners: Male   Lifestyle    Physical activity:     Days per week: Not on file     Minutes per session: Not on file    Stress: Not on file   Relationships    Social connections:     Talks on phone: Not on file     Gets together: Not on file     Attends Quaker service: Not on file     Active member of club or organization: Not on file     Attends meetings of clubs or organizations: Not on file     Relationship status: Not on file    Intimate partner violence:     Fear of current or ex partner: Not on file     Emotionally abused: Not on file     Physically abused: Not on file     Forced sexual activity: Not on file   Other Topics Concern    Not on file   Social History Narrative    Not on file            Visit Vitals  /82 (BP 1 Location: Right arm, BP Patient Position: Sitting)   Pulse 95   Temp 98 °F (36.7 °C) (Oral)   Resp 14   Ht 5' 3\" (1.6 m)   Wt 150 lb 6.4 oz (68.2 kg)   SpO2 100%   BMI 26.64 kg/m²       Physical Examination:   General - Well appearing female  HEENT - PERRL, TM no erythema/opacification, normal nasal turbinates, no oropharyngeal erythema or exudate, MMM  Neck - supple, no bruits, no thyroidomegaly, no lymphadenopathy  Pulm - clear to auscultation bilaterally  Cardio - RRR, normal S1 S2, no murmur  Abd - soft, nontender, no masses, no HSM  Extrem - no edema, +2 distal pulses  Neuro-  No focal deficits, CN intact     Assessment/Plan:    1. Routine adult health maintenance--check cbc, cmp, tsh  2.   Uncontrolled dm, type 2--insurance no longer covers tresiba. rx given to switch to lantus. Check a1c, urine micro  3.  ocd--continue zoloft  4. Hx psvt--on metoprolol  5.  hyperlipids--check flp, likely resume lipitor    Reminded to get retinal exam, mamm, pap.  rtc 3 months. Flu shot given today.         Zack Braun III, DO

## 2019-10-29 LAB
ALBUMIN SERPL-MCNC: 4.6 G/DL (ref 3.5–5.5)
ALBUMIN/CREAT UR: 4.9 MG/G CREAT (ref 0–30)
ALBUMIN/GLOB SERPL: 2 {RATIO} (ref 1.2–2.2)
ALP SERPL-CCNC: 78 IU/L (ref 39–117)
ALT SERPL-CCNC: 30 IU/L (ref 0–32)
AST SERPL-CCNC: 27 IU/L (ref 0–40)
BASOPHILS # BLD AUTO: 0.1 X10E3/UL (ref 0–0.2)
BASOPHILS NFR BLD AUTO: 1 %
BILIRUB SERPL-MCNC: 0.3 MG/DL (ref 0–1.2)
BUN SERPL-MCNC: 13 MG/DL (ref 6–24)
BUN/CREAT SERPL: 19 (ref 9–23)
CALCIUM SERPL-MCNC: 9.2 MG/DL (ref 8.7–10.2)
CHLORIDE SERPL-SCNC: 101 MMOL/L (ref 96–106)
CHOLEST SERPL-MCNC: 197 MG/DL (ref 100–199)
CO2 SERPL-SCNC: 22 MMOL/L (ref 20–29)
CREAT SERPL-MCNC: 0.68 MG/DL (ref 0.57–1)
CREAT UR-MCNC: 180.8 MG/DL
EOSINOPHIL # BLD AUTO: 0.1 X10E3/UL (ref 0–0.4)
EOSINOPHIL NFR BLD AUTO: 1 %
ERYTHROCYTE [DISTWIDTH] IN BLOOD BY AUTOMATED COUNT: 12.6 % (ref 12.3–15.4)
EST. AVERAGE GLUCOSE BLD GHB EST-MCNC: 258 MG/DL
GLOBULIN SER CALC-MCNC: 2.3 G/DL (ref 1.5–4.5)
GLUCOSE SERPL-MCNC: 183 MG/DL (ref 65–99)
HBA1C MFR BLD: 10.6 % (ref 4.8–5.6)
HCT VFR BLD AUTO: 40.9 % (ref 34–46.6)
HDLC SERPL-MCNC: 47 MG/DL
HGB BLD-MCNC: 13.4 G/DL (ref 11.1–15.9)
IMM GRANULOCYTES # BLD AUTO: 0 X10E3/UL (ref 0–0.1)
IMM GRANULOCYTES NFR BLD AUTO: 0 %
LDLC SERPL CALC-MCNC: 123 MG/DL (ref 0–99)
LYMPHOCYTES # BLD AUTO: 2 X10E3/UL (ref 0.7–3.1)
LYMPHOCYTES NFR BLD AUTO: 36 %
MCH RBC QN AUTO: 26.9 PG (ref 26.6–33)
MCHC RBC AUTO-ENTMCNC: 32.8 G/DL (ref 31.5–35.7)
MCV RBC AUTO: 82 FL (ref 79–97)
MICROALBUMIN UR-MCNC: 8.9 UG/ML
MONOCYTES # BLD AUTO: 0.3 X10E3/UL (ref 0.1–0.9)
MONOCYTES NFR BLD AUTO: 5 %
NEUTROPHILS # BLD AUTO: 3.2 X10E3/UL (ref 1.4–7)
NEUTROPHILS NFR BLD AUTO: 57 %
PLATELET # BLD AUTO: 238 X10E3/UL (ref 150–450)
POTASSIUM SERPL-SCNC: 3.7 MMOL/L (ref 3.5–5.2)
PROT SERPL-MCNC: 6.9 G/DL (ref 6–8.5)
RBC # BLD AUTO: 4.98 X10E6/UL (ref 3.77–5.28)
SODIUM SERPL-SCNC: 139 MMOL/L (ref 134–144)
TRIGL SERPL-MCNC: 133 MG/DL (ref 0–149)
TSH SERPL DL<=0.005 MIU/L-ACNC: 1.54 UIU/ML (ref 0.45–4.5)
VLDLC SERPL CALC-MCNC: 27 MG/DL (ref 5–40)
WBC # BLD AUTO: 5.7 X10E3/UL (ref 3.4–10.8)

## 2019-10-29 NOTE — TELEPHONE ENCOUNTER
----- Message from Jonathanbaldo Ohs sent at 10/29/2019  4:07 PM EDT -----  Regarding: Dr. Urbina Erps: [phone]  Caller's first and last name: Merlin Dyer  Reason for call: to check up on Rx  Callback required yes/no and why: yes  Best contact number(s): [phone]  Details to clarify the request: To check up on a Rx sent yesterday to pharmacy of insulin. Pt was expecting to get a prefilled insulin pen.

## 2019-10-30 RX ORDER — ATORVASTATIN CALCIUM 40 MG/1
40 TABLET, FILM COATED ORAL DAILY
Qty: 90 TAB | Refills: 3 | Status: SHIPPED | OUTPATIENT
Start: 2019-10-30 | End: 2022-05-19 | Stop reason: ALTCHOICE

## 2019-10-30 RX ORDER — INSULIN GLARGINE 100 [IU]/ML
INJECTION, SOLUTION SUBCUTANEOUS
Qty: 2 ADJUSTABLE DOSE PRE-FILLED PEN SYRINGE | Refills: 11 | Status: SHIPPED | OUTPATIENT
Start: 2019-10-30 | End: 2021-01-07 | Stop reason: SDUPTHER

## 2019-10-30 NOTE — PROGRESS NOTES
She could use your help. a1c is bit improved at least to 10. 6. rx sent in to resume lipitor. Other labs look good.

## 2019-10-31 ENCOUNTER — TELEPHONE (OUTPATIENT)
Dept: INTERNAL MEDICINE CLINIC | Age: 49
End: 2019-10-31

## 2019-10-31 NOTE — TELEPHONE ENCOUNTER
Pharmacy Progress Note - Telephone Encounter    S/O: Ms. Tano Almendarez 52 y.o. female, referred by Dr. Nestor Zheng, was contacted via an outbound telephone call to discuss her recent labs today. Verified patients identifiers (name & ) per HIPAA policy. 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 - Reports this year has been difficult for her with her father's recent passing.    - Knows she can do better with her diabetes. Wt Readings from Last 3 Encounters:   10/28/19 150 lb 6.4 oz (68.2 kg)   10/04/18 145 lb (65.8 kg)   18 147 lb 14.4 oz (67.1 kg)     Current Outpatient Medications on File Prior to Visit   Medication Sig Dispense Refill    insulin glargine (LANTUS,BASAGLAR) 100 unit/mL (3 mL) inpn Inject 20 units each day. DX E11.65 2 Adjustable Dose Pre-filled Pen Syringe 11    atorvastatin (LIPITOR) 40 mg tablet Take 1 Tab by mouth daily. 90 Tab 3    FISH OIL-DHA-EPA PO Take  by mouth daily.  insulin glargine (LANTUS U-100 INSULIN) 100 unit/mL injection Use 20 units each day. 3 Vial 4    sertraline (ZOLOFT) 100 mg tablet TAKE ONE TABLET BY MOUTH ONE TIME DAILY 30 Tab 11    metFORMIN (GLUCOPHAGE) 1,000 mg tablet TAKE ONE TABLET BY MOUTH TWICE A DAY 60 Tab 11    insulin degludec (TRESIBA FLEXTOUCH U-100) 100 unit/mL (3 mL) inpn Inject 20 units under the skin daily 1 Adjustable Dose Pre-filled Pen Syringe 0    metoprolol tartrate (LOPRESSOR) 25 mg tablet Take 0.5 Tabs by mouth two (2) times a day. (Patient taking differently: Take 12.5 mg by mouth two (2) times daily as needed.) 30 Tab 0     No current facility-administered medications on file prior to visit.       Lab Results   Component Value Date/Time    Sodium 139 10/28/2019 09:44 AM    Potassium 3.7 10/28/2019 09:44 AM    Chloride 101 10/28/2019 09:44 AM    CO2 22 10/28/2019 09:44 AM    Anion gap 8 2018 12:16 AM    Glucose 183 (H) 10/28/2019 09:44 AM    BUN 13 10/28/2019 09:44 AM    Creatinine 0.68 10/28/2019 09:44 AM    BUN/Creatinine ratio 19 10/28/2019 09:44 AM    GFR est  10/28/2019 09:44 AM    GFR est non- 10/28/2019 09:44 AM    Calcium 9.2 10/28/2019 09:44 AM     Lab Results   Component Value Date/Time    Cholesterol, total 197 10/28/2019 09:44 AM    HDL Cholesterol 47 10/28/2019 09:44 AM    LDL, calculated 123 (H) 10/28/2019 09:44 AM    VLDL, calculated 27 10/28/2019 09:44 AM    Triglyceride 133 10/28/2019 09:44 AM     Lab Results   Component Value Date/Time    Microalb/Creat ratio (ug/mg creat.) 4.9 10/28/2019 09:45 AM       Lab Results   Component Value Date/Time    Hemoglobin A1c 10.6 (H) 10/28/2019 09:44 AM    Hemoglobin A1c 11.3 (H) 07/21/2017 08:34 AM     Last Point of Care HGB A1C  Hemoglobin A1c (POC)   Date Value Ref Range Status   10/04/2018 8.1 % Final      Estimated Creatinine Clearance: 90.1 mL/min (by C-G formula based on SCr of 0.68 mg/dL). A/P:  - Discussed lab findings with patient. - Encourage patient to take lipitor 40 mg daily.   - Scheduled for Nov. 11th @ 10:15 AM. Bring all meds and glucometer for review. - Patient endorses understanding to the provided information. All questions were answered at this time.      Thank you for the consult,  Elvia Domínguez, PharmD, CDE

## 2020-03-13 ENCOUNTER — TELEPHONE (OUTPATIENT)
Dept: PHARMACY | Age: 50
End: 2020-03-13

## 2020-03-13 NOTE — TELEPHONE ENCOUNTER
Called patient in regards to [facility]. Left Message for call back.  Sending message via My Chart

## 2020-04-16 ENCOUNTER — VIRTUAL VISIT (OUTPATIENT)
Dept: INTERNAL MEDICINE CLINIC | Age: 50
End: 2020-04-16

## 2020-04-16 NOTE — PATIENT INSTRUCTIONS
Office Policies Phone calls/patient messages: Please allow up to 24 hours for someone in the office to contact you about your call or message. Be mindful your provider may be out of the office or your message may require further review. We encourage you to use NXTM for your messages as this is a faster, more efficient way to communicate with our office Medication Refills: 
         
Prescription medications require 48-72 business hours to process. We encourage you to use NXTM for your refills. For controlled medications: Please allow 72 business hours to process. Certain medications may require you to  a written prescription at our office. NO narcotic/controlled medications will be prescribed after 4pm Monday through Friday or on weekends Form/Paperwork Completion: 
         
Please note a $25 fee may incur for all paperwork for completed by our providers. We ask that you allow 7-10 business days. Pre-payment is due prior to picking up/faxing the completed form. You may also download your forms to NXTM to have your doctor print off. This is an established visit conducted via telemedicine. The patient has been instructed that this meets HIPAA criteria and acknowledges and agrees to this method of visitation.  
 
Malachi Sands LPN 
00/19/07 
1:05 AM

## 2020-04-17 NOTE — PROGRESS NOTES
Pt unable to be seen. Would not answer her phone or email. This is at least her 3rd missed or cancelled appt. Any more misses and she will be dismissed from my practice.

## 2021-01-07 DIAGNOSIS — E11.21 TYPE 2 DIABETES WITH NEPHROPATHY (HCC): ICD-10-CM

## 2021-01-07 RX ORDER — INSULIN GLARGINE 100 [IU]/ML
INJECTION, SOLUTION SUBCUTANEOUS
Qty: 2 ADJUSTABLE DOSE PRE-FILLED PEN SYRINGE | Refills: 0 | Status: SHIPPED | OUTPATIENT
Start: 2021-01-07 | End: 2021-03-16 | Stop reason: SDUPTHER

## 2021-01-07 NOTE — TELEPHONE ENCOUNTER
----- Message from Lorena Marino sent at 1/7/2021  9:42 AM EST -----  Regarding: /telephone  Contact: [phone]  General Message/Vendor Calls    Caller's first and last name:N/A      Reason for call:She is following up regarding her Lantus rx, she is completely out. She is also asking if there were any available samples. Callback required yes/no and why:Yes for status on Lantus rx.       Best contact number(s):[phone]       Details to clarify the request:N/A      Message from [facility]

## 2021-01-11 ENCOUNTER — TELEPHONE (OUTPATIENT)
Dept: INTERNAL MEDICINE CLINIC | Age: 51
End: 2021-01-11

## 2021-01-11 DIAGNOSIS — E11.21 TYPE 2 DIABETES WITH NEPHROPATHY (HCC): Primary | ICD-10-CM

## 2021-01-11 RX ORDER — INSULIN GLARGINE 100 [IU]/ML
20 INJECTION, SOLUTION SUBCUTANEOUS DAILY
Qty: 5 ADJUSTABLE DOSE PRE-FILLED PEN SYRINGE | Refills: 0 | Status: SHIPPED | OUTPATIENT
Start: 2021-01-11 | End: 2022-05-19 | Stop reason: ALTCHOICE

## 2021-01-11 NOTE — TELEPHONE ENCOUNTER
Patient states she needs a call back from Dr. Corby Castro nurse & Does Not Want to Speak with [facility]. Patient states \"her Lantus was sent to the Wrong Pharmacy & it's a Long Story that she just needs a call from the Nurse But Not Valarie\". Please call.  Thank you

## 2021-02-19 RX ORDER — SERTRALINE HYDROCHLORIDE 100 MG/1
TABLET, FILM COATED ORAL
Qty: 30 TAB | Refills: 11 | Status: SHIPPED | OUTPATIENT
Start: 2021-02-19 | End: 2022-03-24 | Stop reason: SDUPTHER

## 2021-03-12 RX ORDER — METFORMIN HYDROCHLORIDE 1000 MG/1
TABLET ORAL
Qty: 60 TAB | Refills: 11 | Status: SHIPPED | OUTPATIENT
Start: 2021-03-12 | End: 2022-07-26

## 2021-03-16 ENCOUNTER — OFFICE VISIT (OUTPATIENT)
Dept: INTERNAL MEDICINE CLINIC | Age: 51
End: 2021-03-16

## 2021-03-16 VITALS
TEMPERATURE: 97.9 F | RESPIRATION RATE: 12 BRPM | WEIGHT: 145.2 LBS | HEART RATE: 118 BPM | HEIGHT: 63 IN | SYSTOLIC BLOOD PRESSURE: 137 MMHG | BODY MASS INDEX: 25.73 KG/M2 | OXYGEN SATURATION: 100 % | DIASTOLIC BLOOD PRESSURE: 88 MMHG

## 2021-03-16 DIAGNOSIS — E11.21 TYPE 2 DIABETES WITH NEPHROPATHY (HCC): ICD-10-CM

## 2021-03-16 DIAGNOSIS — Z00.00 ANNUAL PHYSICAL EXAM: Primary | ICD-10-CM

## 2021-03-16 DIAGNOSIS — Z12.4 SCREENING FOR CERVICAL CANCER: ICD-10-CM

## 2021-03-16 DIAGNOSIS — F42.8 OTHER OBSESSIVE-COMPULSIVE DISORDERS: ICD-10-CM

## 2021-03-16 DIAGNOSIS — I47.1 PAROXYSMAL SVT (SUPRAVENTRICULAR TACHYCARDIA) (HCC): ICD-10-CM

## 2021-03-16 DIAGNOSIS — E78.5 HYPERLIPIDEMIA ASSOCIATED WITH TYPE 2 DIABETES MELLITUS (HCC): ICD-10-CM

## 2021-03-16 DIAGNOSIS — E11.69 HYPERLIPIDEMIA ASSOCIATED WITH TYPE 2 DIABETES MELLITUS (HCC): ICD-10-CM

## 2021-03-16 DIAGNOSIS — Z12.11 SCREEN FOR COLON CANCER: ICD-10-CM

## 2021-03-16 DIAGNOSIS — Z12.31 VISIT FOR SCREENING MAMMOGRAM: ICD-10-CM

## 2021-03-16 PROCEDURE — 99396 PREV VISIT EST AGE 40-64: CPT | Performed by: INTERNAL MEDICINE

## 2021-03-16 RX ORDER — BISMUTH SUBSALICYLATE 262 MG
1 TABLET,CHEWABLE ORAL DAILY
COMMUNITY
End: 2022-07-01

## 2021-03-16 RX ORDER — CHOLECALCIFEROL (VITAMIN D3) 50 MCG
10 CAPSULE ORAL DAILY
COMMUNITY
End: 2022-08-15 | Stop reason: ALTCHOICE

## 2021-03-16 RX ORDER — METOPROLOL SUCCINATE 25 MG/1
12.5 TABLET, EXTENDED RELEASE ORAL DAILY
Qty: 90 TAB | Refills: 3 | Status: SHIPPED | OUTPATIENT
Start: 2021-03-16 | End: 2022-08-15 | Stop reason: SDUPTHER

## 2021-03-16 NOTE — PATIENT INSTRUCTIONS
Learning About Tests When You Have Diabetes  Why do you need regular tests? Diabetes can lead to other health problems if it's not well controlled. You'll need tests to monitor how well your diabetes is controlled and to check for other things like high cholesterol or kidney problems. Having tests on a regular schedule can help your doctor find problems early, when it's easier to manage them. What tests do you need? These are the tests you may need and how often you should have them. A1c blood test.   This test shows the average level of blood sugar over the past 2 to 3 months. It helps your doctor see whether blood sugar levels have been staying within your target range. How often: Every 3 to 6 months  Goal: A blood sugar level in your target range  Blood pressure test.   This test measures the pressure of blood flow in the arteries. Controlling blood pressure can help prevent damage to nerves and blood vessels. How often: Every 3 to 6 months  Goal: A blood pressure level in your target range  Cholesterol test.   This test measures the amount of a type of fat in the blood. It is common for people with diabetes to also have high cholesterol. Too much cholesterol in the blood can build up inside the blood vessels and raise the risk for heart attack and stroke. How often: At the time of your diabetes diagnosis, and as often as your doctor recommends after that  Goal: A cholesterol level in your target range  Albumin-creatinine ratio test.   This test checks for kidney damage by looking for the protein albumin (say \"al-BYOO-lan\") in the urine. Albumin is normally found in the blood. Kidney damage can let small amounts of it (microalbumin) leak into the urine. How often: Once a year  Goal: No protein in the urine  Blood creatinine test/estimated glomerular filtration (eGFR). The blood creatinine (say \"dgyy-GL-wl-neen\") level shows how well your kidneys are working.  Creatinine is a waste product that muscles release into the blood. Blood creatinine is used to estimate the glomerular filtration rate. A high level of creatinine and/or a low eGFR may mean your kidneys are not working as well as they should. How often: Once a year  Goal: Normal level of creatinine in the blood. The eGFR goal is greater than 60 mL/min/1.73 m². Complete foot exam.   The doctor checks for foot sores and whether any sensation has been lost.  How often: Once a year  Goal: Healthy feet with no foot ulcers or loss of feeling  Dental exam and cleaning. The dentist checks for gum disease and tooth decay. People with high blood sugar are more likely to have these problems. How often: Every 6 months  Goal: Healthy teeth and gums  Complete eye exam.   High blood sugar levels can damage the eyes. This exam is done by an ophthalmologist or optometrist. It includes a dilated eye exam. The exam shows whether there's damage to the back of the eye (diabetic retinopathy). How often: Once a year. If you don't have any signs of diabetic retinopathy, your doctor may recommend an exam every 2 years. Goal: No damage to the back of the eye  Thyroid-stimulating hormone (TSH) blood test.   This test checks for thyroid disease. Too little thyroid hormone can cause some medicines (like insulin) to stay in the body longer. This can cause low blood sugar. You may be tested if you have high cholesterol or are a woman over 48years old. How often: As part of your diabetes diagnosis, and as often as your doctor recommends after that  Goal: Normal level of TSH in the blood  Follow-up care is a key part of your treatment and safety. Be sure to make and go to all appointments, and call your doctor if you are having problems. It's also a good idea to know your test results and keep a list of the medicines you take. Where can you learn more?   Go to http://www.[employer].com/  Enter A243 in the search box to learn more about \"Learning About Tests When You Have Diabetes. \"  Current as of: December 20, 2019               Content Version: 12.6  © 5165-7228 [employer]. Care instructions adapted under license by [employer] (which disclaims liability or warranty for this information). If you have questions about a medical condition or this instruction, always ask your healthcare professional. [place] [address] any warranty or liability for your use of this information. Get fasting labs done. Get mammogram, pap, retinal eye exam and colon done before next visit with me in 6 months.

## 2021-04-28 ENCOUNTER — TELEPHONE (OUTPATIENT)
Dept: INTERNAL MEDICINE CLINIC | Age: 51
End: 2021-04-28

## 2021-04-28 RX ORDER — ONDANSETRON 4 MG/1
4 TABLET, ORALLY DISINTEGRATING ORAL
Qty: 30 TAB | Refills: 0 | Status: SHIPPED | OUTPATIENT
Start: 2021-04-28

## 2021-04-28 NOTE — TELEPHONE ENCOUNTER
----- Message from Rosa Maria Lopez sent at 4/28/2021  8:29 AM EDT -----  Regarding: Dr. Nicolasa Flores Message/Vendor Calls    Caller's first and last name: Radha Frankel      Reason for call: Positive COVID test      Callback required yes/no and why: Yes      Best contact number(s): [phone]      Details to clarify the request: Pt tested positive for COVID and would like a call back.       Message from [facility]

## 2021-04-28 NOTE — TELEPHONE ENCOUNTER
Patient called and wanted to let us know that she has tested positive for covid-19     She wants to know if  You can send in a script for Zofran . Also patient wants to know what she can take for body aches that is not a fever reducer.

## 2021-05-04 ENCOUNTER — HOSPITAL ENCOUNTER (EMERGENCY)
Age: 51
Discharge: HOME | End: 2021-05-04
Attending: EMERGENCY MEDICINE
Payer: COMMERCIAL

## 2021-05-04 ENCOUNTER — APPOINTMENT (OUTPATIENT)
Dept: GENERAL RADIOLOGY | Age: 51
End: 2021-05-04
Attending: EMERGENCY MEDICINE
Payer: COMMERCIAL

## 2021-05-04 VITALS
RESPIRATION RATE: 15 BRPM | WEIGHT: 135.58 LBS | DIASTOLIC BLOOD PRESSURE: 67 MMHG | SYSTOLIC BLOOD PRESSURE: 115 MMHG | HEART RATE: 108 BPM | TEMPERATURE: 98.5 F | HEIGHT: 63 IN | BODY MASS INDEX: 24.02 KG/M2 | OXYGEN SATURATION: 99 %

## 2021-05-04 DIAGNOSIS — U07.1 COVID-19 VIRUS INFECTION: Primary | ICD-10-CM

## 2021-05-04 DIAGNOSIS — R06.02 SOB (SHORTNESS OF BREATH): ICD-10-CM

## 2021-05-04 LAB
ALBUMIN SERPL-MCNC: 3.6 G/DL (ref 3.5–5)
ALBUMIN/GLOB SERPL: 1 {RATIO} (ref 1.1–2.2)
ALP SERPL-CCNC: 68 U/L (ref 45–117)
ALT SERPL-CCNC: 40 U/L (ref 12–78)
ANION GAP SERPL CALC-SCNC: 7 MMOL/L (ref 5–15)
AST SERPL-CCNC: 26 U/L (ref 15–37)
ATRIAL RATE: 113 BPM
BASOPHILS # BLD: 0 K/UL (ref 0–0.1)
BASOPHILS NFR BLD: 0 % (ref 0–1)
BILIRUB SERPL-MCNC: 0.3 MG/DL (ref 0.2–1)
BUN SERPL-MCNC: 11 MG/DL (ref 6–20)
BUN/CREAT SERPL: 12 (ref 12–20)
CALCIUM SERPL-MCNC: 9 MG/DL (ref 8.5–10.1)
CALCULATED P AXIS, ECG09: 37 DEGREES
CALCULATED R AXIS, ECG10: 13 DEGREES
CALCULATED T AXIS, ECG11: 35 DEGREES
CHLORIDE SERPL-SCNC: 106 MMOL/L (ref 97–108)
CK SERPL-CCNC: 51 U/L (ref 26–192)
CO2 SERPL-SCNC: 24 MMOL/L (ref 21–32)
CREAT SERPL-MCNC: 0.9 MG/DL (ref 0.55–1.02)
DIAGNOSIS, 93000: NORMAL
DIFFERENTIAL METHOD BLD: NORMAL
EOSINOPHIL # BLD: 0 K/UL (ref 0–0.4)
EOSINOPHIL NFR BLD: 0 % (ref 0–7)
ERYTHROCYTE [DISTWIDTH] IN BLOOD BY AUTOMATED COUNT: 13 % (ref 11.5–14.5)
GLOBULIN SER CALC-MCNC: 3.6 G/DL (ref 2–4)
GLUCOSE SERPL-MCNC: 237 MG/DL (ref 65–100)
HCT VFR BLD AUTO: 38.7 % (ref 35–47)
HGB BLD-MCNC: 12.8 G/DL (ref 11.5–16)
IMM GRANULOCYTES # BLD AUTO: 0 K/UL (ref 0–0.04)
IMM GRANULOCYTES NFR BLD AUTO: 0 % (ref 0–0.5)
LYMPHOCYTES # BLD: 1.5 K/UL (ref 0.8–3.5)
LYMPHOCYTES NFR BLD: 31 % (ref 12–49)
MCH RBC QN AUTO: 27.2 PG (ref 26–34)
MCHC RBC AUTO-ENTMCNC: 33.1 G/DL (ref 30–36.5)
MCV RBC AUTO: 82.3 FL (ref 80–99)
MONOCYTES # BLD: 0.3 K/UL (ref 0–1)
MONOCYTES NFR BLD: 6 % (ref 5–13)
NEUTS SEG # BLD: 3 K/UL (ref 1.8–8)
NEUTS SEG NFR BLD: 63 % (ref 32–75)
NRBC # BLD: 0 K/UL (ref 0–0.01)
NRBC BLD-RTO: 0 PER 100 WBC
P-R INTERVAL, ECG05: 148 MS
PLATELET # BLD AUTO: 175 K/UL (ref 150–400)
PMV BLD AUTO: 10.9 FL (ref 8.9–12.9)
POTASSIUM SERPL-SCNC: 3.6 MMOL/L (ref 3.5–5.1)
PROT SERPL-MCNC: 7.2 G/DL (ref 6.4–8.2)
Q-T INTERVAL, ECG07: 346 MS
QRS DURATION, ECG06: 80 MS
QTC CALCULATION (BEZET), ECG08: 474 MS
RBC # BLD AUTO: 4.7 M/UL (ref 3.8–5.2)
SODIUM SERPL-SCNC: 137 MMOL/L (ref 136–145)
TROPONIN I SERPL-MCNC: <0.05 NG/ML
VENTRICULAR RATE, ECG03: 113 BPM
WBC # BLD AUTO: 4.8 K/UL (ref 3.6–11)

## 2021-05-04 PROCEDURE — 93005 ELECTROCARDIOGRAM TRACING: CPT

## 2021-05-04 PROCEDURE — 74011250636 HC RX REV CODE- 250/636: Performed by: EMERGENCY MEDICINE

## 2021-05-04 PROCEDURE — 82550 ASSAY OF CK (CPK): CPT

## 2021-05-04 PROCEDURE — 80053 COMPREHEN METABOLIC PANEL: CPT

## 2021-05-04 PROCEDURE — 71045 X-RAY EXAM CHEST 1 VIEW: CPT

## 2021-05-04 PROCEDURE — 36415 COLL VENOUS BLD VENIPUNCTURE: CPT

## 2021-05-04 PROCEDURE — 85025 COMPLETE CBC W/AUTO DIFF WBC: CPT

## 2021-05-04 PROCEDURE — 84484 ASSAY OF TROPONIN QUANT: CPT

## 2021-05-04 PROCEDURE — 99284 EMERGENCY DEPT VISIT MOD MDM: CPT

## 2021-05-04 RX ORDER — BENZONATATE 100 MG/1
100 CAPSULE ORAL
Qty: 30 CAP | Refills: 0 | Status: SHIPPED | OUTPATIENT
Start: 2021-05-04 | End: 2021-05-11

## 2021-05-04 RX ORDER — PREDNISONE 20 MG/1
20 TABLET ORAL DAILY
Qty: 5 TAB | Refills: 0 | Status: SHIPPED | OUTPATIENT
Start: 2021-05-04 | End: 2021-05-09

## 2021-05-04 RX ADMIN — SODIUM CHLORIDE 1000 ML: 9 INJECTION, SOLUTION INTRAVENOUS at 13:44

## 2021-05-04 NOTE — ED NOTES
1230: Assumed care of patient from triage. Patient placed on monitor x 3. SR x 2. Call bell in reach. 1310: Xray at bedside. 1436: Montine Kussmaul has reviewed discharge instructions with the patient. The patient verbalized understanding. Patient aware of prescriptions to be picked up at pharmacy. Ambulatory out of ED at this time.

## 2021-05-04 NOTE — ED PROVIDER NOTES
EMERGENCY DEPARTMENT HISTORY AND PHYSICAL EXAM      Date: 5/4/2021  Patient Name: Radha Frankel    History of Presenting Illness     Chief Complaint   Patient presents with    Positive For Covid-19     + for COVID last tuesday. Reports 2 days ago she began to run a temp again like 101.5, having a cough that she reports it feels like something is there to come up but nothing does       History Provided By: Patient    HPI: Radha Frankel, 46 y.o. female presents to the ED with cc of COVID-19 infection. Patient symptoms started 9 days ago. Consisted of body aches, nonproductive cough and fever. She tested positive for COVID-19 1 week ago. Initially her symptoms seemed to improve, but over the last 2 days, she has had a temperature of 101.4 and increased coughing. She has some shortness of breath associated with the cough but denies chest pain. She took an Advil at 10 AM today. She denies headache at this time, but states she had headaches when her symptoms originally started. She denies abdominal pain, diarrhea, vomiting or dysuria. She denies leg pain or leg edema. There are no other complaints, changes, or physical findings at this time. PCP: Rupa Fierro, DO    No current facility-administered medications on file prior to encounter. Current Outpatient Medications on File Prior to Encounter   Medication Sig Dispense Refill    ondansetron (ZOFRAN ODT) 4 mg disintegrating tablet Take 1 Tab by mouth every eight (8) hours as needed for Nausea or Vomiting. 30 Tab 0    multivitamin (ONE A DAY) tablet Take 1 Tab by mouth daily.  B.infantis-B.ani-B.long-B.bifi (Probiotic 4X) 10-15 mg TbEC Take 10 mg by mouth daily.  metoprolol succinate (TOPROL-XL) 25 mg XL tablet Take 0.5 Tabs by mouth daily.  90 Tab 3    metFORMIN (GLUCOPHAGE) 1,000 mg tablet TAKE ONE TABLET BY MOUTH TWICE A DAY 60 Tab 11    sertraline (ZOLOFT) 100 mg tablet TAKE ONE TABLET BY MOUTH ONE TIME DAILY 30 Tab 11    insulin glargine (LANTUS,BASAGLAR) 100 unit/mL (3 mL) inpn 20 Units by SubCUTAneous route daily. 5 Adjustable Dose Pre-filled Pen Syringe 0    atorvastatin (LIPITOR) 40 mg tablet Take 1 Tab by mouth daily. 90 Tab 3    FISH OIL-DHA-EPA PO Take  by mouth daily.  insulin glargine (LANTUS U-100 INSULIN) 100 unit/mL injection Use 20 units each day. 3 Vial 4    metoprolol tartrate (LOPRESSOR) 25 mg tablet Take 0.5 Tabs by mouth two (2) times a day. (Patient taking differently: Take 12.5 mg by mouth two (2) times daily as needed.) 30 Tab 0       Past History     Past Medical History:  Past Medical History:   Diagnosis Date    Diabetes ([address]     Hypercholesterolemia     Menopause     OCD (obsessive compulsive disorder)        Past Surgical History:  Past Surgical History:   Procedure Laterality Date    HX  SECTION       and     HX CHOLECYSTECTOMY      HX OTHER SURGICAL      teeth extracted; has dentures       Family History:  Family History   Problem Relation Age of Onset   [facility] COPD Mother    [facility] Hypertension Mother    [facility] Cancer Mother     Hypertension Father     Cancer Father     Cancer Sister     Diabetes Maternal Aunt     Diabetes Maternal Uncle        Social History:  Social History     Tobacco Use    Smoking status: Former Smoker    Smokeless tobacco: Never Used   Substance Use Topics    Alcohol use: Yes     Comment: occassionally    Drug use: No     Types: Prescription, OTC       Allergies: Allergies   Allergen Reactions    Erythromycin Other (comments)     Severe stomach pains         Review of Systems   Review of Systems   Constitutional: Positive for chills and fever. HENT: Negative for congestion. Eyes: Negative. Respiratory: Positive for cough and shortness of breath. Cardiovascular: Negative for chest pain. Gastrointestinal: Negative for abdominal pain. Endocrine: Negative for heat intolerance. Genitourinary: Negative for dysuria. Musculoskeletal: Positive for myalgias. Negative for back pain. Skin: Negative for rash. Allergic/Immunologic: Negative for immunocompromised state. Neurological: Positive for headaches. Hematological: Does not bruise/bleed easily. Psychiatric/Behavioral: Negative. All other systems reviewed and are negative. Physical Exam   Physical Exam  Vitals signs and nursing note reviewed. Constitutional:       General: She is not in acute distress. Appearance: She is well-developed. HENT:      Head: Normocephalic. Neck:      Musculoskeletal: Normal range of motion and neck supple. Cardiovascular:      Rate and Rhythm: Normal rate and regular rhythm. Pulses: Normal pulses. Heart sounds: Normal heart sounds. Pulmonary:      Effort: Pulmonary effort is normal.      Breath sounds: Normal breath sounds. Abdominal:      General: Bowel sounds are normal.      Palpations: Abdomen is soft. Tenderness: There is no abdominal tenderness. Musculoskeletal: Normal range of motion. Skin:     General: Skin is warm and dry. Neurological:      General: No focal deficit present. Mental Status: She is alert and oriented to person, place, and time.    Psychiatric:         Mood and Affect: Mood normal.         Behavior: Behavior normal.         Diagnostic Study Results     Labs -     Recent Results (from the past 12 hour(s))   EKG, 12 LEAD, INITIAL    Collection Time: 05/04/21 12:28 PM   Result Value Ref Range    Ventricular Rate 113 BPM    Atrial Rate 113 BPM    P-R Interval 148 ms    QRS Duration 80 ms    Q-T Interval 346 ms    QTC Calculation (Bezet) 474 ms    Calculated P Axis 37 degrees    Calculated R Axis 13 degrees    Calculated T Axis 35 degrees    Diagnosis       Sinus tachycardia  Possible Left atrial enlargement  When compared with ECG of 06-AUG-2018 22:56,  Nonspecific T wave abnormality, improved in Inferior leads  Nonspecific T wave abnormality no longer evident in Lateral leads     CBC WITH AUTOMATED DIFF    Collection Time: 05/04/21 12:54 PM   Result Value Ref Range    WBC 4.8 3.6 - 11.0 K/uL    RBC 4.70 3.80 - 5.20 M/uL    HGB 12.8 11.5 - 16.0 g/dL    HCT 38.7 35.0 - 47.0 %    MCV 82.3 80.0 - 99.0 FL    MCH 27.2 26.0 - 34.0 PG    MCHC 33.1 30.0 - 36.5 g/dL    RDW 13.0 11.5 - 14.5 %    PLATELET 659 482 - 446 K/uL    MPV 10.9 8.9 - 12.9 FL    NRBC 0.0 0  WBC    ABSOLUTE NRBC 0.00 0.00 - 0.01 K/uL    NEUTROPHILS 63 32 - 75 %    LYMPHOCYTES 31 12 - 49 %    MONOCYTES 6 5 - 13 %    EOSINOPHILS 0 0 - 7 %    BASOPHILS 0 0 - 1 %    IMMATURE GRANULOCYTES 0 0.0 - 0.5 %    ABS. NEUTROPHILS 3.0 1.8 - 8.0 K/UL    ABS. LYMPHOCYTES 1.5 0.8 - 3.5 K/UL    ABS. MONOCYTES 0.3 0.0 - 1.0 K/UL    ABS. EOSINOPHILS 0.0 0.0 - 0.4 K/UL    ABS. BASOPHILS 0.0 0.0 - 0.1 K/UL    ABS. IMM. GRANS. 0.0 0.00 - 0.04 K/UL    DF AUTOMATED     METABOLIC PANEL, COMPREHENSIVE    Collection Time: 05/04/21 12:54 PM   Result Value Ref Range    Sodium 137 136 - 145 mmol/L    Potassium 3.6 3.5 - 5.1 mmol/L    Chloride 106 97 - 108 mmol/L    CO2 24 21 - 32 mmol/L    Anion gap 7 5 - 15 mmol/L    Glucose 237 (H) 65 - 100 mg/dL    BUN 11 6 - 20 MG/DL    Creatinine 0.90 0.55 - 1.02 MG/DL    BUN/Creatinine ratio 12 12 - 20      GFR est AA >60 >60 ml/min/1.73m2    GFR est non-AA >60 >60 ml/min/1.73m2    Calcium 9.0 8.5 - 10.1 MG/DL    Bilirubin, total 0.3 0.2 - 1.0 MG/DL    ALT (SGPT) 40 12 - 78 U/L    AST (SGOT) 26 15 - 37 U/L    Alk.  phosphatase 68 45 - 117 U/L    Protein, total 7.2 6.4 - 8.2 g/dL    Albumin 3.6 3.5 - 5.0 g/dL    Globulin 3.6 2.0 - 4.0 g/dL    A-G Ratio 1.0 (L) 1.1 - 2.2     CK W/ REFLX CKMB    Collection Time: 05/04/21 12:54 PM   Result Value Ref Range    CK 51 26 - 192 U/L   TROPONIN I    Collection Time: 05/04/21 12:54 PM   Result Value Ref Range    Troponin-I, Qt. <0.05 <0.05 ng/mL       Radiologic Studies -   XR CHEST PORT   Final Result   Normal chest.        CT Results  (Last 48 hours)    None CXR Results  (Last 48 hours)    None          Medical Decision Making   I am the first provider for this patient. I reviewed the vital signs, available nursing notes, past medical history, past surgical history, family history and social history. Vital Signs-Reviewed the patient's vital signs. Patient Vitals for the past 12 hrs:   Temp Pulse Resp BP SpO2   05/04/21 1221 98.5 °F (36.9 °C) (!) 123 22 (!) 153/107 100 %       EKG interpretation: (Preliminary)  Rhythm: sinus tachycardia; and regular . Rate (approx.): 113; Axis: normal; MO interval: normal; QRS interval: normal ; ST/T wave: non-specific changes; Other findings: .    Records Reviewed: Nursing Notes and Old Medical Records    Provider Notes (Medical Decision Making):   1 Covid 9 infection, pneumonia    ED Course:   Initial assessment performed. The patients presenting problems have been discussed, and they are in agreement with the care plan formulated and outlined with them. I have encouraged them to ask questions as they arise throughout their visit. Progress note:    Patient is feeling better. Her results were reviewed. She is advised to follow-up and return to ER if worse               Critical Care Time:   0    Disposition:  home    DISCHARGE PLAN:  1. Discharge Medication List as of 5/4/2021  2:13 PM      START taking these medications    Details   predniSONE (DELTASONE) 20 mg tablet Take 20 mg by mouth daily for 5 days. With Breakfast, Normal, Disp-5 Tab, R-0      benzonatate (Tessalon Perles) 100 mg capsule Take 1 Cap by mouth three (3) times daily as needed for Cough for up to 7 days. , Normal, Disp-30 Cap, R-0         CONTINUE these medications which have NOT CHANGED    Details   ondansetron (ZOFRAN ODT) 4 mg disintegrating tablet Take 1 Tab by mouth every eight (8) hours as needed for Nausea or Vomiting., Normal, Disp-30 Tab, R-0      multivitamin (ONE A DAY) tablet Take 1 Tab by mouth daily. , Historical Med B.infantis-B.ani-B.long-B.bifi (Probiotic 4X) 10-15 mg TbEC Take 10 mg by mouth daily. , Historical Med      metoprolol succinate (TOPROL-XL) 25 mg XL tablet Take 0.5 Tabs by mouth daily. , Normal, Disp-90 Tab, R-3      metFORMIN (GLUCOPHAGE) 1,000 mg tablet TAKE ONE TABLET BY MOUTH TWICE A DAY, Normal, Disp-60 Tab, R-11      sertraline (ZOLOFT) 100 mg tablet TAKE ONE TABLET BY MOUTH ONE TIME DAILY, Normal, Disp-30 Tab, R-11      insulin glargine (LANTUS,BASAGLAR) 100 unit/mL (3 mL) inpn 20 Units by SubCUTAneous route daily. , Normal, Disp-5 Adjustable Dose Pre-filled Pen Syringe, R-0      atorvastatin (LIPITOR) 40 mg tablet Take 1 Tab by mouth daily. , Normal, Disp-90 Tab, R-3Please consider 90 day supplies to promote better adherence      FISH OIL-DHA-EPA PO Take  by mouth daily. , Historical Med      insulin glargine (LANTUS U-100 INSULIN) 100 unit/mL injection Use 20 units each day., Normal, Disp-3 Vial, R-4      metoprolol tartrate (LOPRESSOR) 25 mg tablet Take 0.5 Tabs by mouth two (2) times a day., Print, Disp-30 Tab, R-0           2. Follow-up Information     Follow up With Specialties Details Why Contact Info    Izzy Weiner, DO Internal Medicine  As needed [facility] [address]. [phone]      \[facility] [place] [facility]\"" EMERGENCY DEPT Emergency Medicine  If symptoms worsen [address]  [address] [facility]  [phone]        3. Return to ED if worse     Diagnosis     Clinical Impression:   1. COVID-19 virus infection    2. SOB (shortness of breath)        Attestations:    Jose Davison MD    Please note that this dictation was completed with [employer] the [employer] voice recognition software. Quite often unanticipated grammatical, syntax, homophones, and other interpretive errors are inadvertently transcribed by the computer software. Please disregard these errors. Please excuse any errors that have escaped final proofreading. Thank you.

## 2021-05-05 ENCOUNTER — PATIENT OUTREACH (OUTPATIENT)
Dept: OTHER | Age: 51
End: 2021-05-05

## 2021-05-05 NOTE — PROGRESS NOTES
[facility]". Discussed COVID-19 related testing which was available at this time, patient aware of her positive test results. ED visit at [facility] on  - c/o SOB, has been in touch with her PCP to let him know of her positive test result. Patient has a pulse ox, O2 sats are 98-99%. Ambulatory Care Manager contacted the patient by telephone to perform post discharge assessment. Call within 2 business days of discharge: Yes Verified name and  with patient as identifiers. Provided introduction to self, and explanation of the CTN/ACM role, and reason for call due to risk factors for infection and/or exposure to COVID-19. Symptoms reviewed with patient who verbalized the following symptoms: fever, fatigue, cough, shortness of breath, nausea, no new symptoms and no worsening symptoms - reports feeling better today. Patient states she is feeling much better. Has been in touch with Occupational Health and received a packet for \"leave\". She is scheduled to go back to work on 5/10. Advised pushing fluids and rest for now. Reach out to PCP for worsening symptoms. Will send COVID resource letter. Due to no new or worsening symptoms encounter was not routed to provider for escalation. Discussed follow-up appointments. If no appointment was previously scheduled, appointment scheduling offered:  no Has been in contact with PCP, he is aware of what's going on  [facility] follow up appointment(s):   Future Appointments   Date Time Provider Jos eM Ivan   2021  8:30 AM Naty Soria III, DO [facility] BS [facility]     Non-BS follow up appointment(s): none   Advance Care Planning:   Does patient have an Advance Directive:  not on file; education provided. Patient has following risk factors of: diabetes.  ACM reviewed discharge instructions, medical action plan and red flags such as increased shortness of breath, increasing fever and signs of decompensation with patient who verbalized understanding. Discussed exposure protocols and quarantine with CDC Guidelines What to do if you are sick with coronavirus disease 2019.  Patient was given an opportunity for questions and concerns. The patient agrees to contact the [facility] exposure line [phone], local [facility] department BESSIE Ng [address]  ([phone] and PCP office for questions related to their healthcare. ACM provided contact information for future needs. Reviewed and educated patient on any new and changed medications related to discharge diagnosis. Was patient discharged with a pulse oximeter? no Discussed and confirmed pulse oximeter discharge instructions and when to notify provider or seek emergency care. Goals      Demonstrates no return to ED or red flags within 30 days      · Assess patient knowledge of how to contact the provider for questions if needed;  · Educate patient on importance of monitoring for any red flags;  · Review importance of reporting any changes, red flags to the provider and/or PCP;  · Assess patient's knowledge of discharge instructions and any restrictions;  · Educate patient when to call 911;  · Assess for any barriers with safety at home  · Discuss use of nurse access line and location of number on front of insurance card. · Supportive Resources:  · [employer] - # [phone]  · [facility] Arte[facility] Quaam 24/7 (virtual visits 24/7)  · [employer] # [phone] PW: [facility] for associates  · Nurse Access line 24/7 # [phone]  · Be Well (www.ApplyInc.com)  · [address] [phone]  · [address] [facility] [phone]  · HR Service Now  [facility]   · [facility] Workday  · IT - 3-305-933-944-533-5736  · MyChart Help - 1- [phone]  · Associate Services for advice and direction, by calling [phone] and pressing 1            Plan for follow-up call in 5-7 days\" based on severity of symptoms and risk factors. Call back on 5/12.

## 2021-05-05 NOTE — LETTER
5/5/2021 10:01 AM 
 
Ms. Cheo Varner [address] [facility] is dedicated to empowering the good health of its community and improving the quality of care and care experiences for our patients and their families. The COVID-19 pandemic has impacted all of us in different ways and [facility] is here to support issues that may require services including: navigating your care and services; addressing high risk factors; providing care coordination; or providing social support. I would like to support you during this difficult and stressful time. The Associate Care Management (ACM) program is a free-of-charge service provided to our associates and their family members covered by the [facility]. We serve our ministry as an additional resource providing confidential assistance to symptomatic patients, and education to associates and family members who are at risk for complications due to LRZGQ-37; especially for those over 61years of age, or have a chronic condition, such as heart disease, diabetes, or lung disease. You may have opted into a free symptom tracker Get Well Loop that can be accessed from a Smart phone or computer. If your symptoms worsen, the program will alert me or my team to give you a call or tell you to call your doctor or 911 depending on the severity of your symptoms. Resources and Contacts related to [place] 19: Worsening symptoms?  Contact PCP office for questions related to your healthcare or schedule a MyChart E-Visit.  Complete a free COVID-19 related E-Visit with a [facility] provider at [email or web address]. Use the Coupon Code W4717871.  Contact the COVID-19 hotline [phone] with worsening symptoms that you feel cannot be managed at home. Call 911 anytime you think you may need emergency care. For example, call if: 
·         You have severe trouble breathing.  (You can't talk at all.) ·         You have constant chest pain or pressure. ·         You are severely dizzy or lightheaded. ·         You are confused or can't think clearly. ·         Your face and lips have a blue color. ·         You pass out (lose consciousness) or are very hard to wake up. COVID Questions? Contact the COVID-19 hotline [phone] and/or local health department: for COVID 19 related questions. Associate Related Questions? Contact Associate Services for HR related questions: [phone] PAOLA Line: option 1 Associate Health Nurse: option 8 Additional Resources: You may be feeling anxious, depressed or overwhelmed. If you urgently need to talk or 
text with someone, please reach out to the following resources: 
Contact [employer] [phone] for links to local resources or support. Use code BSMH1. https://[employer]. WooMe/portal/welcome/sso Call The Substance Abuse and Mental Health Services Administration's Disaster Distress Helpline: 3-515-962-898 Veterans Crisis Line: 1-282.566.7272 Text TalkWithUs to 33617 (TTY: 0-808.680.5547), or 
TALK to 788420 Contact your ACM, Josh Nance for COVID-19 or other medical related questions: [phone]. Breathing Techniques to improve your cough: 
Coughing is how your body tries to get rid of mucus. But the kind of coughing you cannot control makes things worse. It causes your airways to close. It also traps the mucus in your lungs. Controlled coughing comes from deep in your lungs. It loosens mucus and moves it though your airways. It is best to do it after you use your inhaler or other medicine. 1. Sit on the edge of a chair. Keep both feet on the floor. 2. Lean forward a little. Relax. 3. Breathe in slowly through your nose. Fold your arms over your belly. 4. Lean forward as you breathe out. Push your arms against your belly. 5. Cough 2 or 3 times as you exhale with your mouth slightly open. Make the coughs short and sharp. Push on your belly with your arms as you cough. The first cough brings the mucus through the lung airways. The next coughs bring it up and out. 6. Inhale again, but do it slowly and gently through your nose. Do not take quick or deep breaths through your mouth. It can block the mucus coming out of the lungs. It also can cause uncontrolled coughing. 7. Rest, and repeat if you need to. How do you do postural drainage? Postural drainage means lying down in different positions to help drain mucus from your lungs. Hold each position for 5 minutes. Do it about 30 minutes after you use your inhaler. Make sure you have an empty stomach. If you need to cough, sit up and do controlled coughing. 1. To drain the front of your lungs: Make sure your chest is lower than your hips. Put two pillows under your hips. Use a small pillow under your head. Keep your arms at your sides. Then follow these instructions for breathing: ? Breathe in: With one hand on your belly and the other on your chest, breathe in. Push your belly out as far as possible. You should be able to feel the hand on your belly move out, while the hand on your chest should not move. ? Breathe out: When you breathe out, you should be able to feel the hand on your belly move in. This is called diaphragmatic breathing. You will use it in the other drainage positions too. 2. To drain the sides of your lungs: Place two or three pillows under your hips. Use a small pillow under your head. Make sure your chest is lower than your hips. Use diaphragmatic breathing. After 5 or 10 minutes, switch sides. 3. To drain the back of your lungs: Place two or three pillows under your hips. Use a small pillow under your head. Kneel over the pillows. Place your arms by your head. Use diaphragmatic breathing. How do you do chest percussion?  
Chest percussion means that you lightly tap your chest and back. The tapping loosens the mucus in your lungs. · Cup your hand, and lightly tap your chest and back. · Ask your doctor where the best spots are to tap. Avoid your spine and breastbone. · It may be easier to have someone do the tapping for you. Follow-up care is a key part of your treatment and safety. Be sure to make and go to all appointments, and call your doctor if you are having problems. It's also a good idea to know your test results and keep a list of the medicines you take.

## 2021-05-12 ENCOUNTER — PATIENT OUTREACH (OUTPATIENT)
Dept: OTHER | Age: 51
End: 2021-05-12

## 2021-05-12 NOTE — PROGRESS NOTES
Follow up phone call to patient, two pt identifiers verified. Discussed patient's goals:   Goals      Demonstrates no return to ED or red flags within 30 days      · Assess patient knowledge of how to contact the provider for questions if needed;  · Educate patient on importance of monitoring for any red flags;  · Review importance of reporting any changes, red flags to the provider and/or PCP;  · Assess patient's knowledge of discharge instructions and any restrictions;  · Educate patient when to call 911;  · Assess for any barriers with safety at home  · Discuss use of nurse access line and location of number on front of insurance card. · Supportive Resources:  · [facility] - # [phone]  · [address] 24/7 (virtual visits 24/7)  · [employer] # [phone] PW: [facility] for associates  · Nurse Access line 24/7 # [phone]  · Be Well (www.M[employer] Guides)  · [address] [phone]  · [address] [facility] [phone]  · HR Service Now  [employer]   · [employer] Workday  · IT - [phone]  · MyChart Help - 1- [phone]  · Associate Services for advice and direction, by calling [phone] and pressing 1  5/12: No red flags. Feeling better and back to work. She stated she was at the hospital with her mother. Wished her well. Patient's primary care provider relationship reviewed with patient and modified, as applicable. Symptoms resolved. Patient is back to work but currently at the hospital with her mom, who is a patient. Provided support and encouragement. Upcoming appointments:   Future Appointments   Date Time Provider Jose M Ivan   9/22/2021  8:30 AM Rupa Fierro DO [facility] BS [facility]     Plan for next call:  6/2 resolve.

## 2021-06-02 ENCOUNTER — PATIENT OUTREACH (OUTPATIENT)
Dept: OTHER | Age: 51
End: 2021-06-02

## 2021-06-02 NOTE — PROGRESS NOTES
Patient resolved from Transition of Care episode on 6/2/21      No further outreach scheduled with this CTN/ACM/LPN/HC. Episode of Care resolved. Patient has this CTN/ACM/LPN/HC contact information if future needs arise. Per chart review, next f/up visit with Dr. Codey Aviles is on 9/22/21.

## 2022-03-18 PROBLEM — I47.10 PAROXYSMAL SVT (SUPRAVENTRICULAR TACHYCARDIA): Status: ACTIVE | Noted: 2018-10-04

## 2022-03-18 PROBLEM — I47.1 PAROXYSMAL SVT (SUPRAVENTRICULAR TACHYCARDIA) (HCC): Status: ACTIVE | Noted: 2018-10-04

## 2022-03-19 PROBLEM — E78.5 HYPERLIPIDEMIA ASSOCIATED WITH TYPE 2 DIABETES MELLITUS (HCC): Status: ACTIVE | Noted: 2017-07-23

## 2022-03-19 PROBLEM — E11.69 HYPERLIPIDEMIA ASSOCIATED WITH TYPE 2 DIABETES MELLITUS (HCC): Status: ACTIVE | Noted: 2017-07-23

## 2022-03-19 PROBLEM — E11.21 TYPE 2 DIABETES WITH NEPHROPATHY (HCC): Status: ACTIVE | Noted: 2018-02-20

## 2022-03-20 PROBLEM — F42.9 OCD (OBSESSIVE COMPULSIVE DISORDER): Status: ACTIVE | Noted: 2017-07-11

## 2022-03-24 RX ORDER — SERTRALINE HYDROCHLORIDE 100 MG/1
100 TABLET, FILM COATED ORAL DAILY
Qty: 90 TABLET | Refills: 1 | Status: SHIPPED | OUTPATIENT
Start: 2022-03-24 | End: 2022-03-26 | Stop reason: SDUPTHER

## 2022-03-25 RX ORDER — SERTRALINE HYDROCHLORIDE 100 MG/1
100 TABLET, FILM COATED ORAL DAILY
Qty: 90 TABLET | Refills: 1 | Status: CANCELLED | OUTPATIENT
Start: 2022-03-25

## 2022-03-25 NOTE — TELEPHONE ENCOUNTER
Future Appointments:  Future Appointments   Date Time Provider Jose M Ivan   7/1/2022  9:00 AM Melony Soria, DO [facility]        Last Appointment With Me:  Visit date not found     Requested Prescriptions     Pending Prescriptions Disp Refills    sertraline (ZOLOFT) 100 mg tablet 90 Tablet 1     Sig: Take 1 Tablet by mouth daily.

## 2022-03-26 ENCOUNTER — DOCUMENTATION ONLY (OUTPATIENT)
Dept: INTERNAL MEDICINE CLINIC | Age: 52
End: 2022-03-26

## 2022-03-26 RX ORDER — SERTRALINE HYDROCHLORIDE 100 MG/1
100 TABLET, FILM COATED ORAL DAILY
Qty: 90 TABLET | Refills: 1 | Status: SHIPPED | OUTPATIENT
Start: 2022-03-26 | End: 2022-10-31

## 2022-03-26 NOTE — PROGRESS NOTES
Pt called asking if ok to take left over augmentin with ibuprogen and hydrocodone for infected tooth pain.  Ok to take medication and fu with Dentist.

## 2022-03-26 NOTE — PROGRESS NOTES
Pt called requesting refill of Zoloft-sent in Pul[facility]ix. Done.    Disregard prior note please from today--will delete

## 2022-05-19 ENCOUNTER — OFFICE VISIT (OUTPATIENT)
Dept: PRIMARY CARE CLINIC | Age: 52
End: 2022-05-19
Payer: COMMERCIAL

## 2022-05-19 VITALS
TEMPERATURE: 97.8 F | RESPIRATION RATE: 16 BRPM | HEIGHT: 63 IN | WEIGHT: 140.8 LBS | SYSTOLIC BLOOD PRESSURE: 142 MMHG | BODY MASS INDEX: 24.95 KG/M2 | DIASTOLIC BLOOD PRESSURE: 93 MMHG

## 2022-05-19 DIAGNOSIS — R42 EPISODE OF DIZZINESS: ICD-10-CM

## 2022-05-19 DIAGNOSIS — H61.21 IMPACTED CERUMEN OF RIGHT EAR: Primary | ICD-10-CM

## 2022-05-19 PROCEDURE — 99202 OFFICE O/P NEW SF 15 MIN: CPT | Performed by: NURSE PRACTITIONER

## 2022-05-19 NOTE — PATIENT INSTRUCTIONS
Earwax Blockage: Care Instructions  Your Care Instructions     Earwax is a natural substance that protects the ear canal. Normally, earwax drains from the ears and does not cause problems. Sometimes earwax builds up and hardens. Earwax blockage (also called cerumen impaction) can cause some loss of hearing and pain. When wax is tightly packed, you will need to have your doctor remove it. Follow-up care is a key part of your treatment and safety. Be sure to make and go to all appointments, and call your doctor if you are having problems. It's also a good idea to know your test results and keep a list of the medicines you take. How can you care for yourself at home? · Do not try to remove earwax with cotton swabs, fingers, or other objects. This can make the blockage worse and damage the eardrum. · If your doctor recommends that you try to remove earwax at home:  ? Soften and loosen the earwax with warm mineral oil. You also can try hydrogen peroxide mixed with an equal amount of room temperature water. Place 2 drops of the fluid, warmed to body temperature, in the ear two times a day for up to 5 days. ? Once the wax is loose and soft, all that is usually needed to remove it from the ear canal is a gentle, warm shower. Direct the water into the ear, then tip your head to let the earwax drain out. Dry your ear thoroughly with a hair dryer set on low. Hold the dryer several inches from your ear. ? If the warm mineral oil and shower do not work, use an over-the-counter wax softener. Read and follow all instructions on the label. After using the wax softener, use an ear syringe to gently flush the ear. Make sure the flushing solution is body temperature. Cool or hot fluids in the ear can cause dizziness. When should you call for help? Call your doctor now or seek immediate medical care if:    · Pus or blood drains from your ear.     · Your ears are ringing or feel full.     · You have a loss of hearing. Watch closely for changes in your health, and be sure to contact your doctor if:    · You have pain or reduced hearing after 1 week of home treatment.     · You have any new symptoms, such as nausea or balance problems. Where can you learn more? Go to http://www.gray.com/  Enter Q495 in the search box to learn more about \"Earwax Blockage: Care Instructions. \"  Current as of: July 1, 2021               Content Version: 13.2  © 2006-2022 [employer]. Care instructions adapted under license by [employer] (which disclaims liability or warranty for this information). If you have questions about a medical condition or this instruction, always ask your healthcare professional. [address] any warranty or liability for your use of this information.

## 2022-05-19 NOTE — PROGRESS NOTES
HISTORY OF PRESENT ILLNESS  Roshan Deras is a 46 y.o. female. Patient reports a provider looked in her ear yesterday and said she had cerumen impaction. . Right ear. Did not have equipment to flush. Hearing loss x 1 week. Dizziness today. History of migraine. Chronic tinnitus. Here for ear lavage. Anoop Dos Santos  Past Surgical History:   Procedure Laterality Date    HX  SECTION       and     HX CHOLECYSTECTOMY      HX OTHER SURGICAL      teeth extracted; has dentures       Review of Systems   Constitutional: Negative for chills, fever and malaise/fatigue. HENT: Positive for hearing loss and tinnitus. Negative for congestion and ear pain. Neurological: Positive for dizziness and headaches. All other systems reviewed and are negative. Physical Exam  Vitals and nursing note reviewed. Constitutional:       General: She is not in acute distress. Appearance: Normal appearance. She is normal weight. HENT:      Ears:        Comments: Right ear small area of cerumen superior to OM. No erythema , bulging or fluid. Nose: Nose normal.   Eyes:      Pupils: Pupils are equal, round, and reactive to light. Cardiovascular:      Rate and Rhythm: Normal rate. Pulses: Normal pulses. Pulmonary:      Effort: Pulmonary effort is normal.   Musculoskeletal:      Cervical back: Normal range of motion. Skin:     General: Skin is warm. Neurological:      General: No focal deficit present. Mental Status: She is alert. Cranial Nerves: Cranial nerves are intact. No cranial nerve deficit. Comments: EOM intact No nystagmus. Psychiatric:         Mood and Affect: Mood normal.         ASSESSMENT and PLAN    ICD-10-CM ICD-9-CM    1. Impacted cerumen of right ear  H61.21 380.4    2. Episode of dizziness  R42 780.4      Encounter Diagnoses   Name Primary?     Impacted cerumen of right ear Yes    Episode of dizziness      No orders of the defined types were placed in this encounter.   Debrox as needed  No evidence of impacted cerumen  Signed By: LISA Lezama     May 19, 2022

## 2022-05-19 NOTE — PROGRESS NOTES
Chief Complaint   Patient presents with    Ear Pain     Pt reports right ear pain, started over the last week.      Visit Vitals  BP (!) 142/93   Temp 97.8 °F (36.6 °C)   Resp 16   Ht 5' 3\" (1.6 m)   Wt 140 lb 12.8 oz (63.9 kg)   BMI 24.94 kg/m²

## 2022-06-30 ENCOUNTER — TELEPHONE (OUTPATIENT)
Dept: INTERNAL MEDICINE CLINIC | Age: 52
End: 2022-06-30

## 2022-06-30 NOTE — TELEPHONE ENCOUNTER
----- Message from Barber Diane sent at 6/30/2022  8:44 AM EDT -----  Subject: Message to Provider    QUESTIONS  Information for Provider? Pt is returing a call from [facility]. Please give   pt. a call back  ---------------------------------------------------------------------------  --------------  CALL BACK INFO  What is the best way for the office to contact you? OK to leave message on   voicemail  Preferred Call Back Phone Number?  [phone]  ---------------------------------------------------------------------------  --------------  SCRIPT ANSWERS  undefined

## 2022-07-01 ENCOUNTER — OFFICE VISIT (OUTPATIENT)
Dept: INTERNAL MEDICINE CLINIC | Age: 52
End: 2022-07-01
Payer: COMMERCIAL

## 2022-07-01 VITALS
OXYGEN SATURATION: 100 % | TEMPERATURE: 98.2 F | RESPIRATION RATE: 14 BRPM | HEIGHT: 63 IN | HEART RATE: 91 BPM | SYSTOLIC BLOOD PRESSURE: 121 MMHG | BODY MASS INDEX: 24.45 KG/M2 | WEIGHT: 138 LBS | DIASTOLIC BLOOD PRESSURE: 82 MMHG

## 2022-07-01 DIAGNOSIS — E78.5 HYPERLIPIDEMIA ASSOCIATED WITH TYPE 2 DIABETES MELLITUS (HCC): ICD-10-CM

## 2022-07-01 DIAGNOSIS — F32.A ANXIETY AND DEPRESSION: ICD-10-CM

## 2022-07-01 DIAGNOSIS — Z12.11 SCREEN FOR COLON CANCER: ICD-10-CM

## 2022-07-01 DIAGNOSIS — F41.9 ANXIETY AND DEPRESSION: ICD-10-CM

## 2022-07-01 DIAGNOSIS — Z00.00 ANNUAL PHYSICAL EXAM: Primary | ICD-10-CM

## 2022-07-01 DIAGNOSIS — E11.69 HYPERLIPIDEMIA ASSOCIATED WITH TYPE 2 DIABETES MELLITUS (HCC): ICD-10-CM

## 2022-07-01 DIAGNOSIS — I47.1 PAROXYSMAL SVT (SUPRAVENTRICULAR TACHYCARDIA) (HCC): ICD-10-CM

## 2022-07-01 DIAGNOSIS — Z12.31 VISIT FOR SCREENING MAMMOGRAM: ICD-10-CM

## 2022-07-01 DIAGNOSIS — E11.21 TYPE 2 DIABETES WITH NEPHROPATHY (HCC): ICD-10-CM

## 2022-07-01 PROCEDURE — 99396 PREV VISIT EST AGE 40-64: CPT | Performed by: INTERNAL MEDICINE

## 2022-07-01 NOTE — PATIENT INSTRUCTIONS
Learning About Carbohydrate (Carb) Counting and Eating Out When You Have Diabetes  Why plan your meals? Meal planning can be a key part of managing diabetes. Planning meals and snacks with the right balance of carbohydrate, protein, and fat can help you keep your blood sugar at the target level you set with your doctor. You don't have to eat special foods. You can eat what your family eats, including sweets once in a while. But you do have to pay attention to how often you eat and how much you eat of certain foods. You may want to work with a dietitian or a diabetes educator. They can give you tips and meal ideas and can answer your questions about meal planning. This health professional can also help you reach a healthy weight if that is one of your goals. What should you know about eating carbs? Managing the amount of carbohydrate (carbs) you eat is an important part of healthy meals when you have diabetes. Carbohydrate is found in many foods. · Learn which foods have carbs. And learn the amounts of carbs in different foods. ? Bread, cereal, pasta, and rice have about 15 grams of carbs in a serving. A serving is 1 slice of bread (1 ounce), ½ cup of cooked cereal, or 1/3 cup of cooked pasta or rice. ? Fruits have 15 grams of carbs in a serving. A serving is 1 small fresh fruit, such as an apple or orange; ½ of a banana; ½ cup of cooked or canned fruit; ½ cup of fruit juice; 1 cup of melon or raspberries; or 2 tablespoons of dried fruit. ? Milk and no-sugar-added yogurt have 15 grams of carbs in a serving. A serving is 1 cup of milk or 3/4 cup (6 oz) of no-sugar-added yogurt. ? Starchy vegetables have 15 grams of carbs in a serving. A serving is ½ cup of mashed potatoes or sweet potato; 1 cup winter squash; ½ of a small baked potato; ½ cup of cooked beans; or ½ cup cooked corn or green peas.   · Learn how much carbs to eat each day and at each meal. A dietitian or certified diabetes educator can teach you how to keep track of the amount of carbs you eat. This is called carbohydrate counting. · If you are not sure how to count carbohydrate grams, use the plate method to plan meals. It is a quick way to make sure that you have a balanced meal. It also can help you manage the amount of carbohydrate you eat at meals. ? Divide your plate by types of foods. Put non-starchy vegetables on half the plate, meat or other protein food on one-quarter of the plate, and a grain or starchy vegetable in the final quarter of the plate. To this you can add a small piece of fruit and 1 cup of milk or yogurt, depending on how many carbs you are supposed to eat at a meal.  · Try to eat about the same amount of carbs at each meal. Do not \"save up\" your daily allowance of carbs to eat at one meal.  · Proteins have very little or no carbs. Examples of proteins are beef, chicken, turkey, fish, eggs, tofu, cheese, cottage cheese, and peanut butter. How can you eat out and still eat healthy? · Learn to estimate the serving sizes of foods that have carbohydrate. If you measure food at home, it will be easier to estimate the amount in a serving of restaurant food. · If the meal you order has too much carbohydrate (such as potatoes, corn, or baked beans), ask to have a low-carbohydrate food instead. Ask for a salad or non-starchy vegetables like broccoli, cauliflower, green beans, or peppers. · If you eat more carbohydrate at a meal than you had planned, take a walk or do other exercise. This will help lower your blood sugar. What are some tips for eating healthy? · Limit saturated fat, such as the fat from meat and dairy products. This is a healthy choice because people who have diabetes are at higher risk of heart disease. So choose lean cuts of meat and nonfat or low-fat dairy products. Use olive or canola oil instead of butter or shortening when cooking. · Don't skip meals.  Your blood sugar may drop too low if you skip meals and take insulin or certain medicines for diabetes. · Check with your doctor before you drink alcohol. Alcohol can cause your blood sugar to drop too low. Alcohol can also cause a bad reaction if you take certain diabetes medicines. Follow-up care is a key part of your treatment and safety. Be sure to make and go to all appointments, and call your doctor if you are having problems. It's also a good idea to know your test results and keep a list of the medicines you take. Where can you learn more? Go to http://www.parham.com/  Enter I147 in the search box to learn more about \"Learning About Carbohydrate (Carb) Counting and Eating Out When You Have Diabetes. \"  Current as of: September 8, 2021               Content Version: 13.2  © 2006-2022 Healthwise, Incorporated. Care instructions adapted under license by [employer] (which disclaims liability or warranty for this information). If you have questions about a medical condition or this instruction, always ask your healthcare professional. [place] [address] any warranty or liability for your use of this information. Make sure to do the cologuard kit as soon as it gets mailed to you. There is a  Phone number that you can call to arrange them to come  the kit from you.

## 2022-07-01 NOTE — PROGRESS NOTES
Isaac Pryor is a 46 y.o. female who presents for evaluation of annual cpe. Last seen by me 2021 in cpe. She no showed for appt 2021. Had rough spell for some time, primarily due to her mom's passing (also my patient). She had end stage copd, and got covid. Some family members blamed her as she also had covid. Stays busy at work with [employer]. Checks sugars few times per week, average about 200. Has not pursued colon, mamm, pap. [facility] [address]       ROS:  Constitutional: negative for fevers, chills, anorexia and weight loss  Eyes:   negative for visual disturbance and irritation  ENT:   negative for tinnitus,sore throat,nasal congestion,ear pain,hoarseness  Respiratory:  negative for cough, hemoptysis, dyspnea,wheezing  CV:   negative for chest pain, palpitations, lower extremity edema  GI:   negative for nausea, vomiting, diarrhea, abdominal pain,melena  Genitourinary: negative for frequency, dysuria and hematuria  Musculoskel: negative for myalgias, arthralgias, back pain, muscle weakness, joint pain  Neurological:  negative for headaches, dizziness, focal weakness, numbness  Psychiatric:     Negative for depression or anxiety      Past Medical History:   Diagnosis Date    Diabetes ([facility] [address]     Hypercholesterolemia     Menopause     OCD (obsessive compulsive disorder)        Past Surgical History:   Procedure Laterality Date    HX  SECTION       and     HX CHOLECYSTECTOMY      HX OTHER SURGICAL      teeth extracted; has dentures       Family History   Problem Relation Age of Onset    COPD Mother     Hypertension Mother     Cancer Mother     Hypertension Father     Cancer Father     Cancer Sister     Diabetes Maternal Aunt     Diabetes Maternal Uncle        Social History     Socioeconomic History    Marital status:      Spouse name: Not on file    Number of children: Not on file    Years of education: Not on file    Highest education level: Not on file   Occupational History    Not on file   Tobacco Use    Smoking status: Former Smoker    Smokeless tobacco: Never Used   Vaping Use    Vaping Use: Never used   Substance and Sexual Activity    Alcohol use: Yes     Comment: occassionally    Drug use: No     Types: Prescription, OTC    Sexual activity: Yes     Partners: Male   Other Topics Concern    Not on file   Social History Narrative    Not on file     Social Determinants of Health     Financial Resource Strain:     Difficulty of Paying Living Expenses: Not on file   Food Insecurity:     Worried About Running Out of Food in the Last Year: Not on file    Ben of Food in the Last Year: Not on file   Transportation Needs:     Lack of Transportation (Medical): Not on file    Lack of Transportation (Non-Medical):  Not on file   Physical Activity:     Days of Exercise per Week: Not on file    Minutes of Exercise per Session: Not on file   Stress:     Feeling of Stress : Not on file   Social Connections:     Frequency of Communication with Friends and Family: Not on file    Frequency of Social Gatherings with Friends and Family: Not on file    Attends [employer] Services: Not on file    Active Member of [address] [place] [address] or Organizations: Not on file    Attends Club or Organization Meetings: Not on file    Marital Status: Not on file   Intimate Partner Violence:     Fear of Current or Ex-Partner: Not on file    Emotionally Abused: Not on file    Physically Abused: Not on file    Sexually Abused: Not on file   Housing Stability:     Unable to Pay for Housing in the Last Year: Not on file    Number of [place] in the Last Year: Not on file    Unstable Housing in the Last Year: Not on file            Visit Vitals  /82 (BP 1 Location: Left upper arm, BP Patient Position: Sitting)   Pulse 91   Temp 98.2 °F (36.8 °C) (Temporal)   Resp 14   Ht 5' 3\" (1.6 m)   Wt 138 lb (62.6 kg)   SpO2 100%   BMI 24.45 kg/m²       Physical Examination:   General - Well appearing female  HEENT - PERRL, TM no erythema/opacification, normal nasal turbinates, no oropharyngeal erythema or exudate, MMM  Neck - supple, no bruits, no thyroidomegaly, no lymphadenopathy  Pulm - clear to auscultation bilaterally  Cardio - RRR, normal S1 S2, no murmur  Abd - soft, nontender, no masses, no HSM  Extrem - no edema, +2 distal pulses  Neuro-  No focal deficits, CN intact     Assessment/Plan:    1. Annual cpe--check cbc, cmp, flp, tsh, hcv  2. Uncontrolled dm, type 2--last a1c 10.6, check a1c and urine micro. On lantus 20 units and glucophage (admits to missing some of her evening doses of it)  3. Hx psvt--well controlled with toprol xl  4.  htn--controlled with toprol xl  5.  anx and dep--controlled with zoloft  6. Screen colon cancer--cologuard ordered  7.  hyperlpids--check flp, not on a statin  8.   Screen breast cancer--mamm ordered    Declines prevnar 20  rtc 6 months        Praful Santos III, DO

## 2022-07-02 LAB
ALBUMIN SERPL-MCNC: 4.3 G/DL (ref 3.8–4.9)
ALBUMIN/GLOB SERPL: 1.9 {RATIO} (ref 1.2–2.2)
ALP SERPL-CCNC: 110 IU/L (ref 44–121)
ALT SERPL-CCNC: 28 IU/L (ref 0–32)
AST SERPL-CCNC: 24 IU/L (ref 0–40)
BASOPHILS # BLD AUTO: 0.1 X10E3/UL (ref 0–0.2)
BASOPHILS NFR BLD AUTO: 1 %
BILIRUB SERPL-MCNC: 0.3 MG/DL (ref 0–1.2)
BUN SERPL-MCNC: 11 MG/DL (ref 6–24)
BUN/CREAT SERPL: 16 (ref 9–23)
CALCIUM SERPL-MCNC: 9.2 MG/DL (ref 8.7–10.2)
CHLORIDE SERPL-SCNC: 101 MMOL/L (ref 96–106)
CHOLEST SERPL-MCNC: 212 MG/DL (ref 100–199)
CO2 SERPL-SCNC: 24 MMOL/L (ref 20–29)
CREAT SERPL-MCNC: 0.69 MG/DL (ref 0.57–1)
EGFR: 104 ML/MIN/1.73
EOSINOPHIL # BLD AUTO: 0.1 X10E3/UL (ref 0–0.4)
EOSINOPHIL NFR BLD AUTO: 1 %
ERYTHROCYTE [DISTWIDTH] IN BLOOD BY AUTOMATED COUNT: 12.7 % (ref 11.7–15.4)
EST. AVERAGE GLUCOSE BLD GHB EST-MCNC: 306 MG/DL
GLOBULIN SER CALC-MCNC: 2.3 G/DL (ref 1.5–4.5)
GLUCOSE SERPL-MCNC: 273 MG/DL (ref 65–99)
HBA1C MFR BLD: 12.3 % (ref 4.8–5.6)
HCT VFR BLD AUTO: 46.3 % (ref 34–46.6)
HCV AB S/CO SERPL IA: <0.1 S/CO RATIO (ref 0–0.9)
HCV AB SERPL QL IA: NORMAL
HDLC SERPL-MCNC: 48 MG/DL
HGB BLD-MCNC: 14.8 G/DL (ref 11.1–15.9)
IMM GRANULOCYTES # BLD AUTO: 0 X10E3/UL (ref 0–0.1)
IMM GRANULOCYTES NFR BLD AUTO: 0 %
LDLC SERPL CALC-MCNC: 133 MG/DL (ref 0–99)
LYMPHOCYTES # BLD AUTO: 2.1 X10E3/UL (ref 0.7–3.1)
LYMPHOCYTES NFR BLD AUTO: 37 %
MCH RBC QN AUTO: 27.5 PG (ref 26.6–33)
MCHC RBC AUTO-ENTMCNC: 32 G/DL (ref 31.5–35.7)
MCV RBC AUTO: 86 FL (ref 79–97)
MONOCYTES # BLD AUTO: 0.3 X10E3/UL (ref 0.1–0.9)
MONOCYTES NFR BLD AUTO: 5 %
NEUTROPHILS # BLD AUTO: 3.2 X10E3/UL (ref 1.4–7)
NEUTROPHILS NFR BLD AUTO: 56 %
PLATELET # BLD AUTO: 216 X10E3/UL (ref 150–450)
POTASSIUM SERPL-SCNC: 4.7 MMOL/L (ref 3.5–5.2)
PROT SERPL-MCNC: 6.6 G/DL (ref 6–8.5)
RBC # BLD AUTO: 5.39 X10E6/UL (ref 3.77–5.28)
SODIUM SERPL-SCNC: 139 MMOL/L (ref 134–144)
TRIGL SERPL-MCNC: 174 MG/DL (ref 0–149)
TSH SERPL DL<=0.005 MIU/L-ACNC: 0.85 UIU/ML (ref 0.45–4.5)
VLDLC SERPL CALC-MCNC: 31 MG/DL (ref 5–40)
WBC # BLD AUTO: 5.7 X10E3/UL (ref 3.4–10.8)

## 2022-07-04 NOTE — PROGRESS NOTES
Needs assistance. Would like to add ozempic, and lipitor. Please discuss with her and let me know her thoughts. Other labs look ok.

## 2022-07-26 RX ORDER — METFORMIN HYDROCHLORIDE 1000 MG/1
TABLET ORAL
Qty: 60 TABLET | Refills: 11 | Status: SHIPPED | OUTPATIENT
Start: 2022-07-26 | End: 2022-08-15 | Stop reason: ALTCHOICE

## 2022-07-29 ENCOUNTER — PATIENT MESSAGE (OUTPATIENT)
Dept: INTERNAL MEDICINE CLINIC | Age: 52
End: 2022-07-29

## 2022-07-29 ENCOUNTER — TELEPHONE (OUTPATIENT)
Dept: INTERNAL MEDICINE CLINIC | Age: 52
End: 2022-07-29

## 2022-07-29 RX ORDER — INSULIN DEGLUDEC INJECTION 100 U/ML
INJECTION, SOLUTION SUBCUTANEOUS
Qty: 15 ML | Refills: 2 | Status: SHIPPED | OUTPATIENT
Start: 2022-07-29 | End: 2022-08-15

## 2022-07-29 RX ORDER — PEN NEEDLE, DIABETIC 31 GX3/16"
1 NEEDLE, DISPOSABLE MISCELLANEOUS DAILY
Qty: 100 PEN NEEDLE | Refills: 11 | Status: SHIPPED | OUTPATIENT
Start: 2022-07-29

## 2022-07-29 NOTE — TELEPHONE ENCOUNTER
Pharmacy Progress Note - Telephone Encounter    S/O: Ms. Aravind Shelby 46 y.o. female, referred by Dr. Baljeet Lopez, was contacted via an outbound telephone call to discuss today. Verified patients identifiers (name & ) per HIPAA policy. - Reports hx of missing her medications. Has been more diligent since recent PCP visit. - Reports FBG ranging between 116-170 in recent weeks   - Giving Lantus 20 units daily AM.  Reports she has enough insulin at this time. - Hx of Tresiba. States insulin worked well for her. Stopped d/t insurance formulary.   - Taking metformin 1 gm BID. Hx of missing PM dose. Lab Results   Component Value Date/Time    Hemoglobin A1c 12.3 (H) 2022 12:00 AM    Hemoglobin A1c 10.6 (H) 10/28/2019 09:44 AM    Hemoglobin A1c 11.3 (H) 2017 08:34 AM     Lab Results   Component Value Date/Time    Sodium 139 2022 12:00 AM    Potassium 4.7 2022 12:00 AM    Chloride 101 2022 12:00 AM    CO2 24 2022 12:00 AM    Anion gap 7 2021 12:54 PM    Glucose 273 (H) 2022 12:00 AM    BUN 11 2022 12:00 AM    Creatinine 0.69 2022 12:00 AM    BUN/Creatinine ratio 16 2022 12:00 AM    GFR est AA >60 2021 12:54 PM    GFR est non-AA >60 2021 12:54 PM    Calcium 9.2 2022 12:00 AM     Estimated Creatinine Clearance: 77.8 mL/min (by C-G formula based on SCr of 0.69 mg/dL). A/P:  - Will send in rx for Tresiba to assess access. Given reported BG, increase insulin dose to 22 units daily. If FBG remains elevated for goal <130, increase to 24 units daily  - Continue metformin 1 gm BID   - Pt has scheduled appt with me 22.   - Patient endorses understanding to the provided information. All questions answered at this time. There are no discontinued medications.   Orders Placed This Encounter    insulin degludec Renbettina Andes FlexTouch U-100) 100 unit/mL (3 mL) inpn     Sig: Inject 24 units under the skin daily Indications: type 2 diabetes mellitus     Dispense:  15 mL     Refill:  2    Insulin Needles, Disposable, 32 gauge x 5/32\" ndle     Si Pen Needle by SubCUTAneous route daily. Use to give insulin under the skin daily.  E11.65     Dispense:  100 Pen Needle     Refill:  11         Thank you,  Elvia Ghosh, PharmD, BCACP, [facility] in place: Yes  Recommendation Provided To: Patient/Caregiver: 4 via Telephone and [employer] Message  Intervention Detail: Adherence Monitorin, Dose Adjustment: 1, reason: Therapy Optimization, and New Rx: 2, reason: Needs Additional Therapy  Intervention Accepted By: Patient/Caregiver: 4  Time Spent (min): 15

## 2022-08-14 NOTE — PROGRESS NOTES
Pharmacy Progress Note - Diabetes Management      Assessment / Plan:   Diabetes Management:  - Per ADA guidelines, Pt's A1c is not at goal of < 7%. BP > 130/80 today - previously controlled. - Discuss concern with patient's persistent hyperglycemia. - Reported BGM remains elevated for goal. Review fasting and post prandial targets of <130 and <180, respectively   - Reviewed and provided resource discussing s/sx of hypoglycemia and management  - Will see if we can get Dexcom CGM covered for patient   - Will change metformin to ER formulation to help mitigate GI SE. Continue 1 gm BID  - Increase Tresiba to 30 units daily.   - Based on patient's VIIS review, she due for COVID 19 booster, Tdap booster  - Recommend Prevnar 20  - Schedule annual eye exam      S/O: Ms. Addis Venegas is a 46 y.o. female, referred by Dr. Mee Covington DO, with a PMH of T2DM+nephropathy, HLD, OCD, Paroxysmal SVT, was seen today for diabetes management. Patient's last A1c was 12.3% (July 2022), 10.6% (Oct 2019). SHx:  - Hospice nurse     Medication Adherence/Access:  - Brought in home medications including prescription/non-prescriptions:  no  - Endorses barriers to affording/accessing medications : no  - Uses a pill box/other methods to organize medications: no  - Endorses adherence to current regimen?: yes  - Uses [facility] ; Rx insurance is: [facility]    Diabetes Management:  Diabetes History:  - Endorses gestational DM 17 yrs ago; started on metformin ~10 yrs ago; insulin about 3-4 yrs ago   - Family hx: both parents - T2DM  - Previous anti-hyperglycemic agents includes:  Lantus --- transitioned to Arty Kansky at the end of July     Current anti-hyperglycemic regimen includes:    - Metformin 1 gm BID --- endorses GI SE. No hx of ER formulation  - Tresiba 22 units daily. If FBG > 130, increase to 24 units daily ---> reports to giving 24 units AM    Denies any missed doses.     ROS:  Today, Pt endorses:  - Symptoms of Hyperglycemia: none  - Symptoms of Hypoglycemia: none    Blood Glucose Monitoring (BGM) or CGM:  No meter/log today. Not very consistent with BG checks  Reports FBG today was 195 --- had more carb servings last night for dinner  Other readings: 130-140s --- low 200s     Lifestyle modification(s):  Eats 2 meals/day + snacks throughout day  Meal schedule erratic during work week d/t schedule  Beverage: Coffee + splenda + cream; diet Mt Dew, water     Last eye exam: last year. Need to schedule       Vitals: Wt Readings from Last 3 Encounters:   08/15/22 140 lb (63.5 kg)   07/01/22 138 lb (62.6 kg)   05/19/22 140 lb 12.8 oz (63.9 kg)     BP Readings from Last 3 Encounters:   08/15/22 (!) 144/88   07/01/22 121/82   05/19/22 (!) 142/93     Pulse Readings from Last 3 Encounters:   08/15/22 98   07/01/22 91   05/04/21 (!) 108       Past Medical History:   Diagnosis Date    Diabetes ([facility] [address]     Hypercholesterolemia     Menopause 2014    OCD (obsessive compulsive disorder)      Allergies   Allergen Reactions    Erythromycin Other (comments)     Severe stomach pains       Current Outpatient Medications   Medication Sig    insulin degludec Garth Pacer FlexTouch U-100) 100 unit/mL (3 mL) inpn Inject 24 units under the skin daily  Indications: type 2 diabetes mellitus    Insulin Needles, Disposable, 32 gauge x 5/32\" ndle 1 Pen Needle by SubCUTAneous route daily. Use to give insulin under the skin daily. E11.65    metFORMIN (GLUCOPHAGE) 1,000 mg tablet TAKE ONE TABLET BY MOUTH TWICE A DAY    sertraline (ZOLOFT) 100 mg tablet Take 1 Tablet by mouth daily. ondansetron (ZOFRAN ODT) 4 mg disintegrating tablet Take 1 Tab by mouth every eight (8) hours as needed for Nausea or Vomiting. B.infantis-B.ani-B.long-B.bifi (Probiotic 4X) 10-15 mg TbEC Take 10 mg by mouth daily. metoprolol succinate (TOPROL-XL) 25 mg XL tablet Take 0.5 Tabs by mouth daily.     insulin glargine (LANTUS U-100 INSULIN) 100 unit/mL injection Use 20 units each day.     No current facility-administered medications for this visit. Lab Results   Component Value Date/Time    Sodium 139 07/01/2022 12:00 AM    Potassium 4.7 07/01/2022 12:00 AM    Chloride 101 07/01/2022 12:00 AM    CO2 24 07/01/2022 12:00 AM    Anion gap 7 05/04/2021 12:54 PM    Glucose 273 (H) 07/01/2022 12:00 AM    BUN 11 07/01/2022 12:00 AM    Creatinine 0.69 07/01/2022 12:00 AM    BUN/Creatinine ratio 16 07/01/2022 12:00 AM    GFR est AA >60 05/04/2021 12:54 PM    GFR est non-AA >60 05/04/2021 12:54 PM    Calcium 9.2 07/01/2022 12:00 AM    Bilirubin, total 0.3 07/01/2022 12:00 AM    Alk. phosphatase 110 07/01/2022 12:00 AM    Protein, total 6.6 07/01/2022 12:00 AM    Albumin 4.3 07/01/2022 12:00 AM    Globulin 3.6 05/04/2021 12:54 PM    A-G Ratio 1.9 07/01/2022 12:00 AM    ALT (SGPT) 28 07/01/2022 12:00 AM     Lab Results   Component Value Date/Time    Cholesterol, total 212 (H) 07/01/2022 12:00 AM    HDL Cholesterol 48 07/01/2022 12:00 AM    LDL, calculated 133 (H) 07/01/2022 12:00 AM    LDL, calculated 123 (H) 10/28/2019 09:44 AM    VLDL, calculated 31 07/01/2022 12:00 AM    VLDL, calculated 27 10/28/2019 09:44 AM    Triglyceride 174 (H) 07/01/2022 12:00 AM     Lab Results   Component Value Date/Time    WBC 5.7 07/01/2022 12:00 AM    HGB 14.8 07/01/2022 12:00 AM    HCT 46.3 07/01/2022 12:00 AM    PLATELET 628 56/56/0552 12:00 AM    MCV 86 07/01/2022 12:00 AM       Lab Results   Component Value Date/Time    Microalb/Creat ratio (ug/mg creat.) 4.9 10/28/2019 09:45 AM       Lab Results   Component Value Date/Time    Hemoglobin A1c 12.3 (H) 07/01/2022 12:00 AM    Hemoglobin A1c 10.6 (H) 10/28/2019 09:44 AM    Hemoglobin A1c 11.3 (H) 07/21/2017 08:34 AM     Hemoglobin A1c (POC)   Date Value Ref Range Status   10/04/2018 8.1 % Final        CrCl cannot be calculated (Unknown ideal weight. ). Medication reconciliation was completed during the visit.     Medications Discontinued During This Encounter Medication Reason    metFORMIN (GLUCOPHAGE) 1,000 mg tablet Alternate Therapy    B.infantis-B.ani-B.long-B.bifi (Probiotic 4X) 10-15 mg TbEC Therapy Completed    insulin glargine (LANTUS U-100 INSULIN) 100 unit/mL injection Alternate Therapy    metoprolol succinate (TOPROL-XL) 25 mg XL tablet REORDER    insulin degludec Marciana Martín FlexTouch U-100) 100 unit/mL (3 mL) inpn      Orders Placed This Encounter    metFORMIN ER (GLUCOPHAGE XR) 500 mg tablet     Sig: Take 2 Tablets by mouth two (2) times a day. Dispense:  360 Tablet     Refill:  1     Change from 1000 mg tablet strength    metoprolol succinate (TOPROL-XL) 25 mg XL tablet     Sig: Take 0.5 Tablets by mouth in the morning. Dispense:  90 Tablet     Refill:  3    Blood-Glucose Meter,Continuous (Dexcom G6 ) misc     Si Each by Does Not Apply route See Admin Instructions. Use to check blood sugar at least 3 times daily     Dispense:  1 Each     Refill:  0    Blood-Glucose Sensor (Dexcom G6 Sensor) tim     Si Each by Does Not Apply route See Admin Instructions. Change every 10 days. Use to monitor blood sugar 4 times daily. Dispense:  3 Each     Refill:  11    Blood-Glucose Transmitter (Dexcom G6 Transmitter) tim     Si Each by Does Not Apply route See Admin Instructions.  Use as directed to monitor blood sugar at least 3 times daily     Dispense:  1 Each     Refill:  2    insulin degludec Marciana Martín FlexTouch U-100) 100 unit/mL (3 mL) inpn     Sig: Inject 30 units under the skin daily  Indications: type 2 diabetes mellitus     Dispense:  15 mL     Refill:  2       Patient's Immunization History:    Immunizations by Immunization family       COVID-19, [place] Vaccine, Vaccine Unspecified 2021 10/13/2021      Hep B Vaccine 2011 2011 10/14/2011     Influenza Vaccine 10/4/2018 10/4/2018 10/28/2019 10/28/2019     2021       MMR 2011       Td 3/10/2011             Patient verbalized understanding of the information presented and all of the patients questions were answered. AVS was handed to the patient. Patient advised to call the office with any additional questions or concerns. Notifications of recommendations will be sent to Dr. Thiago Alexis DO for review. Patient will return to clinic in 6 week(s) for follow up.      Thank you for the consult,  Elvia Back, PharmD, BCACP, [address] [place] [address] in place: Yes  Recommendation Provided To: Patient/Caregiver: 16 via In person  Intervention Detail: Adherence Monitorin, Discontinued Rx: 5, reason: Duplicate Therapy and Therapy Complete, Dose Adjustment: 1, reason: Therapy Optimization, New Rx: 5, reason: Cost/Formulary Change, Improve Adherence, and Patient Preference, Refill(s) Provided, Scheduled Appointment, and Vaccine Recommended/Administered  Gap Closed?:   Intervention Accepted By: Patient/Caregiver: 16  Time Spent (min): 45

## 2022-08-15 ENCOUNTER — OFFICE VISIT (OUTPATIENT)
Dept: INTERNAL MEDICINE CLINIC | Age: 52
End: 2022-08-15

## 2022-08-15 VITALS
HEART RATE: 98 BPM | BODY MASS INDEX: 24.8 KG/M2 | HEIGHT: 63 IN | WEIGHT: 140 LBS | SYSTOLIC BLOOD PRESSURE: 144 MMHG | DIASTOLIC BLOOD PRESSURE: 88 MMHG

## 2022-08-15 DIAGNOSIS — E11.21 TYPE 2 DIABETES WITH NEPHROPATHY (HCC): Primary | ICD-10-CM

## 2022-08-15 RX ORDER — INSULIN DEGLUDEC INJECTION 100 U/ML
INJECTION, SOLUTION SUBCUTANEOUS
Qty: 15 ML | Refills: 2 | Status: SHIPPED | OUTPATIENT
Start: 2022-08-15 | End: 2022-09-26 | Stop reason: SDUPTHER

## 2022-08-15 RX ORDER — METOPROLOL SUCCINATE 25 MG/1
12.5 TABLET, EXTENDED RELEASE ORAL DAILY
Qty: 90 TABLET | Refills: 3 | Status: SHIPPED | OUTPATIENT
Start: 2022-08-15

## 2022-08-15 RX ORDER — METFORMIN HYDROCHLORIDE 500 MG/1
1000 TABLET, EXTENDED RELEASE ORAL 2 TIMES DAILY
Qty: 360 TABLET | Refills: 1 | Status: SHIPPED | OUTPATIENT
Start: 2022-08-15

## 2022-08-15 RX ORDER — BLOOD-GLUCOSE,RECEIVER,CONT
1 EACH MISCELLANEOUS SEE ADMIN INSTRUCTIONS
Qty: 1 EACH | Refills: 0 | Status: SHIPPED | OUTPATIENT
Start: 2022-08-15

## 2022-08-15 RX ORDER — BLOOD-GLUCOSE TRANSMITTER
1 EACH MISCELLANEOUS SEE ADMIN INSTRUCTIONS
Qty: 1 EACH | Refills: 2 | Status: SHIPPED | OUTPATIENT
Start: 2022-08-15

## 2022-08-15 RX ORDER — BLOOD-GLUCOSE SENSOR
1 EACH MISCELLANEOUS SEE ADMIN INSTRUCTIONS
Qty: 3 EACH | Refills: 11 | Status: SHIPPED | OUTPATIENT
Start: 2022-08-15

## 2022-08-15 NOTE — PATIENT INSTRUCTIONS
Increase Tresiba to 30 units daily. Continue metformin --- 500 mg ER -- two tablets twice daily  Let's see if we can get the Dexcom meter for you.     Schedule annual eye exam  You are due for your Tdap booster and COVID 19 booster dose  You are due for your Prevnar 20

## 2022-08-16 ENCOUNTER — DOCUMENTATION ONLY (OUTPATIENT)
Dept: INTERNAL MEDICINE CLINIC | Age: 52
End: 2022-08-16

## 2022-08-16 NOTE — PROGRESS NOTES
Pharmacy Progress Note     PA for Ms. Ulises Mcgarry 46 y.o. 's Dexcom G6 CGM submitted. Verdict pending. Thank you for the consult,  Elvia Alvarez, PharmD, BCACP, [address] in place: Yes  Recommendation Provided To:  Other: 1  Intervention Detail: Patient Access Assistance/Sample Provided  Intervention Accepted By: Other: 1  Time Spent (min): 15

## 2022-08-18 RX ORDER — INSULIN DEGLUDEC INJECTION 100 U/ML
INJECTION, SOLUTION SUBCUTANEOUS
Qty: 1 PEN | Refills: 0 | Status: SHIPPED | COMMUNITY
Start: 2022-08-18 | End: 2022-08-22 | Stop reason: SDUPTHER

## 2022-08-18 NOTE — TELEPHONE ENCOUNTER
Pharmacy Progress Note - Telephone Encounter    S/O: Ms. Héctor Gandhi 46 y.o. female, referred by Dr. Heide Floyd, was contacted via an outbound telephone call to discuss her [employer] message today. Verified patients identifiers (name & ) per HIPAA policy. - Reports FBG today was 124. It was 80 yesterday   - Reports pharmacy will not have Alma Mess available until Saturday. She is running low on current supply. A/P:  - Will assist with Tresiba sample. Continue Tresiba 30 units daily   - PA verdict for Dexcom CGM remains pending.   - Patient endorses understanding to the provided information. All questions answered at this time.      Orders Placed This Encounter    insulin degludec Tony King FlexTouch U-100) 100 unit/mL (3 mL) inpn     Sig: Inject 30 units under the skin daily     Dispense:  1 Pen     Refill:  0       Thank you,  Elvia Portillo, PharmD, BCACP, [address] in place: Yes  Recommendation Provided To: Patient/Caregiver: 1 via Telephone and [employer] Message  Intervention Detail: Patient Access Assistance/Sample Provided  Intervention Accepted By: Patient/Caregiver: 1  Time Spent (min): 10

## 2022-08-22 ENCOUNTER — TELEPHONE (OUTPATIENT)
Dept: INTERNAL MEDICINE CLINIC | Age: 52
End: 2022-08-22

## 2022-08-22 RX ORDER — INSULIN DEGLUDEC INJECTION 100 U/ML
INJECTION, SOLUTION SUBCUTANEOUS
Qty: 15 ML | Refills: 2 | Status: SHIPPED | OUTPATIENT
Start: 2022-08-22 | End: 2022-09-26 | Stop reason: DRUGHIGH

## 2022-08-22 NOTE — TELEPHONE ENCOUNTER
#[phone]  pt is asking for a call pertaining to a script that you wrote for her. Nothing further was given.

## 2022-08-22 NOTE — TELEPHONE ENCOUNTER
Pharmacy Progress Note - Telephone Encounter    S/O: Ms. Tomi Jaimes 46 y.o. female, referred by Dr. Cole Lopez, was contacted via an outbound telephone call to discuss her call today. Verified patients identifiers (name & ) per HIPAA policy. - Reports insurance is requiring maintenance rx to be filled at [facility] [facility] [address]. A/P:  - Will send rx for Tresiba to [facility] [facility] [address]  - Patient endorses understanding to the provided information. All questions answered at this time.      Medications Discontinued During This Encounter   Medication Reason    insulin degludec Pauletta Sharps FlexTouch U-100) 100 unit/mL (3 mL) inpn REORDER     Orders Placed This Encounter    insulin degludec Pauletta Sharps FlexTouch U-100) 100 unit/mL (3 mL) inpn     Sig: Inject 30 units under the skin daily  Indications: type 2 diabetes mellitus     Dispense:  15 mL     Refill:  2         Thank you,  Elvia Hermosillo, PharmD, BCACP, [address] [place] [address] in place: Yes  Recommendation Provided To: Patient/Caregiver: 2 via Telephone  Intervention Detail: Discontinued Rx: 1, reason: Cost/Formulary Change and Duplicate Therapy and New Rx: 1, reason: Cost/Formulary Change  Intervention Accepted By: Patient/Caregiver: 2  Time Spent (min): 5

## 2022-09-06 ENCOUNTER — TELEPHONE (OUTPATIENT)
Dept: INTERNAL MEDICINE CLINIC | Age: 52
End: 2022-09-06

## 2022-09-06 NOTE — TELEPHONE ENCOUNTER
Pharmacy Progress Note - Telephone Encounter    S/O: Ms. Janice Griffin 46 y.o. female, referred by Dr. Minnie Ríos, was contacted via an outbound telephone call to discuss her previous call today. Verified patients identifiers (name & ) per HIPAA policy. A/P:  - Additional PA information faxed to [employer] for Dexcom G6 sensor. Still waiting on verdict.   - Patient endorses understanding to the provided information. All questions answered at this time.          Thank you,  Elvia Mata, PharmD, BCACP, [address] [place] [address] in place: Yes  Recommendation Provided To: Patient/Caregiver: 1 via Telephone and Other: 1  Intervention Detail: Patient Access Assistance/Sample Provided  Intervention Accepted By: Patient/Caregiver: 1 and Other: 1  Time Spent (min): 15

## 2022-09-06 NOTE — TELEPHONE ENCOUNTER
Pt calling for Amarjit Lilo    States that she is awaiting a glucometer    States the pharmacy informed her that the insurance has yet to receive the paperwork sent by our office pharmacist.    States please call pt to update.

## 2022-09-07 ENCOUNTER — DOCUMENTATION ONLY (OUTPATIENT)
Dept: INTERNAL MEDICINE CLINIC | Age: 52
End: 2022-09-07

## 2022-09-07 NOTE — PROGRESS NOTES
Pharmacy Progress Note     Received approval for Ms. Carroll Barbosa 46 y.o. 's Dexcom G6 CGM supplies. Shared update with [facility] pharmacy. PA Case: [id number]    Shared update with patient via [employer].            Thank you for the consult,  Elvia Simpson, PharmD, BCACP, [address] [place] [address] in place: Yes  Recommendation Provided To: Patient/Caregiver: 1 via [address] [place] [address] Blvd: 1  Intervention Detail: Patient Access Assistance/Sample Provided  Intervention Accepted By: Patient/Caregiver: 1 and Pharmacy: 1  Time Spent (min): 10

## 2022-09-24 NOTE — PROGRESS NOTES
Pharmacy Progress Note - Diabetes Management    Assessment / Plan:   Diabetes Management:  - Per ADA guidelines, Pt's POC A1c today (9.4%) is not at goal of < 7%. BP < 130/80. Congratulate patient on current progress   - Current CGM trends indicates post prandial hyperglycemia persists  - Increase Tresiba to 35 units daily  - Continue metformin 500 mg ER - 1 gm BID  - May benefit from the Be Well with DM benefit program.   - Annual flu shot was administered today. VIS provided to patient. Patient tolerated the given vaccine(s) and all questions were answered by the end of visit. S/O: Ms. Uma Almonte is a 46 y.o. female, referred by Dr. Nicola Trejo DO, with a PMH of T2DM+nephropathy, HLD, OCD, Paroxysmal SVT, was seen today for diabetes management. Patient's last A1c was 12.3% (July 2022), 10.6% (Oct 2019). Interim history:  Now using Dexcom CGM. Started 2 weeks ago. Current anti-hyperglycemic regimen include(s):    - Metformin 500 mg ER - 1 gm BID  - Tresiba 30 units daily       ROS:  Today, Pt endorses:  - Symptoms of Hyperglycemia: none  - Symptoms of Hypoglycemia: none    Blood Glucose Monitoring (BGM) or CGM:  - Dexcom G6 CGM ; reader   14 days:      30 days:      Nutrition/Lifestyle Modifications:         Vitals:   Wt Readings from Last 3 Encounters:   09/26/22 144 lb 3.2 oz (65.4 kg)   08/15/22 140 lb (63.5 kg)   07/01/22 138 lb (62.6 kg)     BP Readings from Last 3 Encounters:   09/26/22 129/84   08/15/22 (!) 144/88   07/01/22 121/82     Pulse Readings from Last 3 Encounters:   09/26/22 90   08/15/22 98   07/01/22 91       Past Medical History:   Diagnosis Date    Diabetes ([address]     Hypercholesterolemia     Menopause 2014    OCD (obsessive compulsive disorder)      Allergies   Allergen Reactions    Erythromycin Other (comments)     Severe stomach pains       Current Outpatient Medications   Medication Sig    insulin degludec Nicolasa Angel FlexTouch U-100) 100 unit/mL (3 mL) inpn Inject 30 units under the skin daily  Indications: type 2 diabetes mellitus    metFORMIN ER (GLUCOPHAGE XR) 500 mg tablet Take 2 Tablets by mouth two (2) times a day. metoprolol succinate (TOPROL-XL) 25 mg XL tablet Take 0.5 Tablets by mouth in the morning. Blood-Glucose Meter,Continuous (Dexcom G6 ) misc 1 Each by Does Not Apply route See Admin Instructions. Use to check blood sugar at least 3 times daily    Blood-Glucose Sensor (Dexcom G6 Sensor) tim 1 Each by Does Not Apply route See Admin Instructions. Change every 10 days. Use to monitor blood sugar 4 times daily. Blood-Glucose Transmitter (Dexcom G6 Transmitter) tim 1 Each by Does Not Apply route See Admin Instructions. Use as directed to monitor blood sugar at least 3 times daily    insulin degludec Silver Nay FlexTouch U-100) 100 unit/mL (3 mL) inpn Inject 30 units under the skin daily  Indications: type 2 diabetes mellitus    Insulin Needles, Disposable, 32 gauge x 5/32\" ndle 1 Pen Needle by SubCUTAneous route daily. Use to give insulin under the skin daily. E11.65    sertraline (ZOLOFT) 100 mg tablet Take 1 Tablet by mouth daily. ondansetron (ZOFRAN ODT) 4 mg disintegrating tablet Take 1 Tab by mouth every eight (8) hours as needed for Nausea or Vomiting. No current facility-administered medications for this visit. Lab Results   Component Value Date/Time    Sodium 139 07/01/2022 12:00 AM    Potassium 4.7 07/01/2022 12:00 AM    Chloride 101 07/01/2022 12:00 AM    CO2 24 07/01/2022 12:00 AM    Anion gap 7 05/04/2021 12:54 PM    Glucose 273 (H) 07/01/2022 12:00 AM    BUN 11 07/01/2022 12:00 AM    Creatinine 0.69 07/01/2022 12:00 AM    BUN/Creatinine ratio 16 07/01/2022 12:00 AM    GFR est AA >60 05/04/2021 12:54 PM    GFR est non-AA >60 05/04/2021 12:54 PM    Calcium 9.2 07/01/2022 12:00 AM    Bilirubin, total 0.3 07/01/2022 12:00 AM    Alk.  phosphatase 110 07/01/2022 12:00 AM    Protein, total 6.6 07/01/2022 12:00 AM    Albumin 4.3 07/01/2022 12:00 AM    Globulin 3.6 05/04/2021 12:54 PM    A-G Ratio 1.9 07/01/2022 12:00 AM    ALT (SGPT) 28 07/01/2022 12:00 AM       Lab Results   Component Value Date/Time    Cholesterol, total 212 (H) 07/01/2022 12:00 AM    HDL Cholesterol 48 07/01/2022 12:00 AM    LDL, calculated 133 (H) 07/01/2022 12:00 AM    LDL, calculated 123 (H) 10/28/2019 09:44 AM    VLDL, calculated 31 07/01/2022 12:00 AM    VLDL, calculated 27 10/28/2019 09:44 AM    Triglyceride 174 (H) 07/01/2022 12:00 AM       Lab Results   Component Value Date/Time    WBC 5.7 07/01/2022 12:00 AM    HGB 14.8 07/01/2022 12:00 AM    HCT 46.3 07/01/2022 12:00 AM    PLATELET 540 55/59/5218 12:00 AM    MCV 86 07/01/2022 12:00 AM       Lab Results   Component Value Date/Time    Microalb/Creat ratio (ug/mg creat.) 4.9 10/28/2019 09:45 AM       HbA1c:  Lab Results   Component Value Date/Time    Hemoglobin A1c 12.3 (H) 07/01/2022 12:00 AM    Hemoglobin A1c (POC) 9.4 (A) 09/26/2022 04:38 PM     Last Point of Care HGB A1C  Hemoglobin A1c (POC)   Date Value Ref Range Status   09/26/2022 9.4 (A) 4.8 - 5.6 % Final      Estimated Creatinine Clearance: 85.5 mL/min (by C-G formula based on SCr of 0.69 mg/dL). Medication reconciliation was completed during the visit. Medications Discontinued During This Encounter   Medication Reason    insulin degludec Silver Nay FlexTouch U-100) 100 unit/mL (3 mL) inpn DOSE ADJUSTMENT    insulin degludec Silver Nay FlexTouch U-100) 100 unit/mL (3 mL) inpn DUPLICATE ORDER     Orders Placed This Encounter    Influenza Virus Vaccine QUAD, PF Syr 6 Months + (Flulaval, Fluzone, Fluarix 57320)    AMB POC HEMOGLOBIN A1C    insulin degludec Silver Nay FlexTouch U-100) 100 unit/mL (3 mL) inpn     Sig: Inject 35 units under the skin daily  Indications: type 2 diabetes mellitus     Dispense:  15 mL     Refill:  2       Patient verbalized understanding of the information presented and all of the patients questions were answered.   AVS was handed to the patient. Patient advised to call the office with any additional questions or concerns. Notifications of recommendations will be sent to Dr. Flako Metz DO for review. Patient will return to clinic in 10 week(s) for follow up.      Thank you for the consult,  Elvia Alvarez, PharmD, BCACP, [facility] in place: Yes  Recommendation Provided To: Patient/Caregiver: 7 via In person  Intervention Detail: Discontinued Rx: 2, reason: Duplicate Therapy, Dose Adjustment: 1, reason: Therapy Optimization, Lab(s) Ordered, New Rx: 1, reason: Needs Additional Therapy, Scheduled Appointment, and Vaccine Recommended/Administered  Intervention Accepted By: Patient/Caregiver: 7  Time Spent (min): 45

## 2022-09-26 ENCOUNTER — OFFICE VISIT (OUTPATIENT)
Dept: INTERNAL MEDICINE CLINIC | Age: 52
End: 2022-09-26
Payer: COMMERCIAL

## 2022-09-26 VITALS
SYSTOLIC BLOOD PRESSURE: 129 MMHG | HEART RATE: 90 BPM | DIASTOLIC BLOOD PRESSURE: 84 MMHG | HEIGHT: 63 IN | WEIGHT: 144.2 LBS | BODY MASS INDEX: 25.55 KG/M2

## 2022-09-26 DIAGNOSIS — Z23 NEEDS FLU SHOT: ICD-10-CM

## 2022-09-26 DIAGNOSIS — E11.21 TYPE 2 DIABETES WITH NEPHROPATHY (HCC): Primary | ICD-10-CM

## 2022-09-26 LAB — HBA1C MFR BLD HPLC: 9.4 % (ref 4.8–5.6)

## 2022-09-26 PROCEDURE — 90686 IIV4 VACC NO PRSV 0.5 ML IM: CPT | Performed by: PHARMACIST

## 2022-09-26 PROCEDURE — 83036 HEMOGLOBIN GLYCOSYLATED A1C: CPT | Performed by: PHARMACIST

## 2022-09-26 PROCEDURE — 90471 IMMUNIZATION ADMIN: CPT | Performed by: PHARMACIST

## 2022-09-26 RX ORDER — INSULIN DEGLUDEC 100 U/ML
INJECTION, SOLUTION SUBCUTANEOUS
Qty: 15 ML | Refills: 2 | Status: SHIPPED | OUTPATIENT
Start: 2022-09-26

## 2022-09-26 NOTE — PATIENT INSTRUCTIONS
Your A1c today was 9.4%. Your goal is to be below 7%. Keep up the good work!   Increase Tresiba to 35 units daily  Continue metformin 500 mg ER - two tabs twice daily  Clarity

## 2022-10-03 ENCOUNTER — DOCUMENTATION ONLY (OUTPATIENT)
Dept: PHARMACY | Age: 52
End: 2022-10-03

## 2022-10-03 NOTE — PROGRESS NOTES
Pharmacy Pop Care Documentation:   Patient has completed all the requirements by 10/25/22 and therefore will be enrolled in the DM Program on 11/01/22. Application received: 31/91/26  Application scanned. Letter mailed to patient.     Dakotah Hernandez, [facility]   Department, toll free: [phone] Option #3

## 2022-10-03 NOTE — LETTER
[address]  [address], [place] [address]  Phone: toll free [phone] Option #3         Ms. Bentley Clark  [id number] [id number]        Congratulations! You have successfully enrolled in the Be Well With Diabetes program for 2022. What you receive  Beginning November 1, you will begin receiving up to $300 in waived copays for specific medications and pharmacy-related supplies purchased through our home delivery pharmacy, [facility]. A list of eligible medications and pharmacy supplies can be found at [employer] under Be Well With Diabetes. In addition, youll receive advice and help from our pharmacists, associate care management team and diabetes educators. (And, if you also participate in the Be Well program, you can earn points and Lifestyle Management or Health Management program credit, if applicable.)    What you need to do  To maintain your benefit this year and remain eligible next year, complete the following requirements:              *Can be satisfied through [facility] Screening on-site. **Requirements to enroll must be completed within 6 months of enrollment date **Pneumonia vaccine is dependent on previous immunization and your age. Remember, program requirements must be completed by deadlines shown. If not, your benefit may be terminated, and you will not be eligible to participate again until the following year. To keep you on track, well review your [employer] account and send reminders for action. (If you dont have a [address], submit documentation to [email or web address] or by fax to [phone].)    Congratulations and thank you for taking steps to improve your health and to Be Well With Diabetes. [address] [place] [address]  Phone: [phone] Option #3  Email: [email or web address]  Fax: [phone]

## 2022-10-14 ENCOUNTER — DOCUMENTATION ONLY (OUTPATIENT)
Dept: INTERNAL MEDICINE CLINIC | Age: 52
End: 2022-10-14

## 2022-11-01 ENCOUNTER — DOCUMENTATION ONLY (OUTPATIENT)
Dept: PHARMACY | Age: 52
End: 2022-11-01

## 2022-11-01 NOTE — PROGRESS NOTES
The application for Bentley Clark for enrollment into the diabetes management program has been reviewed and accepted on 11/01/22.     Sary Bustamante

## 2022-11-21 ENCOUNTER — TELEPHONE (OUTPATIENT)
Dept: PHARMACY | Age: 52
End: 2022-11-21

## 2022-11-21 NOTE — TELEPHONE ENCOUNTER
Called patient to schedule 2022 yearly pharmacist appointment to discuss medications for Diabetes Management Program.    No answer. Left VM on mobile TAD: Please call back at [phone] Option #3.      Bertram Almanza [facility] [employer]   Department, toll free: [phone] Option #3

## 2022-11-30 NOTE — TELEPHONE ENCOUNTER
For [place] in place: No  Recommendation Provided To: Patient/Caregiver: 1 via Telephone  Intervention Detail: Scheduled Appointment  Gap Closed?: Yes  Intervention Accepted By: Patient/Caregiver: 1  Time Spent (min): 10

## 2022-11-30 NOTE — TELEPHONE ENCOUNTER
Second attempt made to contact patient to schedule 2022 yearly pharmacist appointment to discuss medications for Diabetes Management Program.    Spoke to patient and appointment scheduled for 12/02/22 at 7:30 AM.    Raymundo Paige, [facility] [employer]   Department, toll free: [phone] Option #3

## 2022-12-02 ENCOUNTER — TELEPHONE (OUTPATIENT)
Dept: PHARMACY | Age: 52
End: 2022-12-02

## 2022-12-02 RX ORDER — PEN NEEDLE, DIABETIC 31 GX3/16"
1 NEEDLE, DISPOSABLE MISCELLANEOUS DAILY
Qty: 100 PEN NEEDLE | Refills: 11 | Status: SHIPPED | OUTPATIENT
Start: 2022-12-02

## 2022-12-02 RX ORDER — BLOOD-GLUCOSE METER
EACH MISCELLANEOUS
Qty: 1 EACH | Refills: 0 | Status: SHIPPED | OUTPATIENT
Start: 2022-12-02

## 2022-12-02 RX ORDER — LANCETS 28 GAUGE
EACH MISCELLANEOUS
Qty: 100 LANCET | Refills: 3 | Status: SHIPPED | OUTPATIENT
Start: 2022-12-02

## 2022-12-02 RX ORDER — ISOPROPYL ALCOHOL 70 ML/100ML
SWAB TOPICAL
Qty: 200 PAD | Refills: 3 | Status: SHIPPED | OUTPATIENT
Start: 2022-12-02

## 2022-12-02 RX ORDER — ATORVASTATIN CALCIUM 40 MG/1
40 TABLET, FILM COATED ORAL DAILY
Qty: 90 TABLET | Refills: 3 | Status: SHIPPED | OUTPATIENT
Start: 2022-12-02

## 2022-12-02 RX ORDER — BLOOD SUGAR DIAGNOSTIC
STRIP MISCELLANEOUS
Qty: 100 STRIP | Refills: 3 | Status: SHIPPED | OUTPATIENT
Start: 2022-12-02

## 2022-12-02 NOTE — TELEPHONE ENCOUNTER
[facility] CLINICAL PHARMACY REVIEW - Be Well with Diabetes    Hector Rivero is a 46 y.o. female enrolled in the [address] [place] [address] Employee Diabetes Program. Patient provided Pelon Stover with verbal consent to remain in the program for this year. Patient enrolled 11/1/22. Insurance through the following employer: [address] [place] [address]    Medications:   Current Outpatient Medications   Medication Sig    sertraline (ZOLOFT) 100 mg tablet TAKE ONE TABLET BY MOUTH ONE TIME DAILY    insulin degludec Jolena Mitts FlexTouch U-100) 100 unit/mL (3 mL) inpn Inject 35 units under the skin daily  Indications: type 2 diabetes mellitus  -  failed lantus    metFORMIN ER (GLUCOPHAGE XR) 500 mg tablet Take 2 Tablets by mouth two (2) times a day. metoprolol succinate (TOPROL-XL) 25 mg XL tablet Take 0.5 Tablets by mouth in the morning. Blood-Glucose Meter,Continuous (Dexcom G6 ) misc 1 Each by Does Not Apply route See Admin Instructions. Use to check blood sugar at least 3 times daily    Blood-Glucose Sensor (Dexcom G6 Sensor) tim 1 Each by Does Not Apply route See Admin Instructions. Change every 10 days. Use to monitor blood sugar 4 times daily. Blood-Glucose Transmitter (Dexcom G6 Transmitter) tim 1 Each by Does Not Apply route See Admin Instructions. Use as directed to monitor blood sugar at least 3 times daily    Insulin Needles, Disposable, 32 gauge x 5/32\" ndle 1 Pen Needle by SubCUTAneous route daily. Use to give insulin under the skin daily. E11.65    ondansetron (ZOFRAN ODT) 4 mg disintegrating tablet Take 1 Tab by mouth every eight (8) hours as needed for Nausea or Vomiting. [facility], willing [facility]  Current testing supplies/frequency: Dexcom G6 but would like back up meter. [employer]   Pen needles/syringes: is paying at [employer]    Allergies:   Allergies   Allergen Reactions    Erythromycin Other (comments)     Severe stomach pains Vitals/Labs:  BP Readings from Last 3 Encounters:   09/26/22 129/84   08/15/22 (!) 144/88   07/01/22 121/82     Lab Results   Component Value Date/Time    Microalb/Creat ratio (ug/mg creat.) 4.9 10/28/2019 09:45 AM     Lab Results   Component Value Date/Time    Hemoglobin A1c 12.3 (H) 07/01/2022 12:00 AM    Hemoglobin A1c 10.6 (H) 10/28/2019 09:44 AM    Hemoglobin A1c 11.3 (H) 07/21/2017 08:34 AM    Hemoglobin A1c (POC) 9.4 (A) 09/26/2022 04:38 PM    Hemoglobin A1c (POC) 8.1 10/04/2018 12:28 PM    Hemoglobin A1c (POC) 10.7 02/20/2018 12:52 PM     Lab Results   Component Value Date/Time    Cholesterol, total 212 (H) 07/01/2022 12:00 AM    HDL Cholesterol 48 07/01/2022 12:00 AM    LDL, calculated 133 (H) 07/01/2022 12:00 AM    LDL, calculated 123 (H) 10/28/2019 09:44 AM    VLDL, calculated 31 07/01/2022 12:00 AM    VLDL, calculated 27 10/28/2019 09:44 AM    Triglyceride 174 (H) 07/01/2022 12:00 AM     ALT (SGPT)   Date Value Ref Range Status   07/01/2022 28 0 - 32 IU/L Final     AST (SGOT)   Date Value Ref Range Status   07/01/2022 24 0 - 40 IU/L Final     The 10-year ASCVD risk score (Avila MCCAIN, et al., 2019) is: 3.6%    Values used to calculate the score:      Age: 46 years      Sex: Female      Is Non- : No      Diabetic: Yes      Tobacco smoker: No      Systolic Blood Pressure: 287 mmHg      Is BP treated: No      HDL Cholesterol: 48 mg/dL      Total Cholesterol: 212 mg/dL     Lab Results   Component Value Date/Time    Creatinine 0.69 07/01/2022 12:00 AM     Estimated Creatinine Clearance: 85.5 mL/min (by C-G formula based on SCr of 0.69 mg/dL).     Lab Results   Component Value Date/Time    GFR est non-AA >60 05/04/2021 12:54 PM    GFR est AA >60 05/04/2021 12:54 PM       Immunizations:  Immunization History   Administered Date(s) Administered    COVID-19, PFIZER PURPLE top, DILUTE for use, (age 15 y+), IM, 30mcg/0.3mL 09/22/2021, 10/13/2021    Hep B Vaccine 04/07/2011, 05/09/2011, 10/14/2011    Influenza Vaccine 10/04/2018, 10/28/2019, 12/28/2021    Influenza, FLUARIX, FLULAVAL, [address] (age 10 mo+) AND AFLURIA, (age 1 y+), PF, 0.5mL 10/04/2018, 10/28/2019, 09/26/2022    MMR 04/25/2011    Td 03/10/2011      Social History:  Social History     Tobacco Use    Smoking status: Former    Smokeless tobacco: Never   Substance Use Topics    Alcohol use: Yes     Comment: occassionally     ASSESSMENT:  Initial Program Requirements (Y indicates has completed for the year, N indicates needs to be completed by 07/01/2022): Yes - Provider Visit for DM (1st)  Yes - A1c (1st)     Ongoing Program Requirements (Y indicates has completed for the year, N indicates needs to be completed by 12/31/2022): Yes - Provider Visit for DM (2nd)  No - ACC/diabetes educator visit (if A1c over 8%)  Yes - A1c (2nd)  Yes - Lipid panel  No - Urine microalbumin  No - Pneumococcal vaccination: [id number]  Yes - Influenza vaccination for Fall 2022  No - Medication adherence over 70%  No - On statin or contraindication(s) Not identified  No - On ACEi/ARB or contraindication(s)  was on lisinopril and had really bad dry cough. Due for urine micro and will monitor      Current medications eligible for copay waiver, up to $300, through [address]:  - tresiba, metformin xr, atorvastatin  - Dexcom G6     Diabetes Care:   - Glycemic Goal: <7.0% and directed by provider. Is not at blood glucose goal but is on insulin therapy. Type 2 DM under poor control as evidenced by > 9.  - Current symptoms/problems include  none  - Home blood sugar records:  fasting range:   but does stay elevated after meals and she is higher in the evenings. - Any episodes of hypoglycemia?  Not often, knows how to treat  - Known diabetic complications: none  - Eye exam current (within one year): yes  - Foot exam current (within one year): yes  - Appropriateness of Insulin Therapy: basal managed by pcp  - Therapy Optimization: could consider glp-1, but was on lantus and just was not working, this is when A1c was 12, then switched to tresiba and this seems to work much better. We did discuss toujeo for cost savings but may not want to switch if tresiba working for her  - Medication compliance: compliant all of the time  - Diet compliance: compliant most of the timemorning- eggs, but drives a lot for work- keeps snacks in car and does fast food. Dinner- a protein, she is not a big veggie person. She is trying to avoid bread. But does do pasta. Diet mountain dew  - Current exercise: no regular exercise, she walks sometimes but would like increase this    Other Considerations:  - Blood Pressure Goal: BP less than 140/90 mmHg due to history of DM: Is at blood pressure goal.   - Lipids: Patient has a 10-yr ASCVD risk of < 20% with DM and is therefore a candidate for moderate-intensity statin therapy based on updated guidelines. - Smoking status:  former    PLAN:  - Consideration(s) for provider:   Prodigy, lancets, test strips, alcohol swabs  Leader 32 x 4mm  Atorvastatin- was on previously, she was not sure why stopped, thinks bc she was trying fish oil instead. Denies SE to atorvastatin and wants to start same one she was on  Has upcoming appt, educated patient to discuss toujeo for cost savings but did fail lantus so may not want to do that.  Trying to save her some money since she is on dexcom  Patient will ask for urine micro- last done in 2019, and prevnar 20  Cont working on lifestyle changes, patient states she would be interested in nutrition referral- educated to discuss with pcp  - DM program gaps identified:   Initial requirements: Requirements met   Ongoing requirements: Urine microalbumin and Pneumococcal vaccination: [id number]   - Education to patient: Discussed general issues about diabetes pathophysiology and management., Addressed diet and exercise, Reminded to get yearly retinal exam., Overview of Be Well With Diabetes program, and Overview of [facility]   - Follow up: PCP for identified gaps or as scheduled below  - Upcoming appointments:   Future Appointments   Date Time Provider Jose M Ivan   12/5/2022  3:45 PM Reuben Herrera, PHARMD [facility] BS AMB   1/3/2023  9:00 AM Vicky Verduzco DO [facility] BS AMB     Blu Mora, PharmD, [address] Pharmacist  Department: [address] [place] [address] in place: No  Recommendation Provided To: Provider: 3 via Note to Provider   Intervention Detail: New Rx: 3, reason: Cost/Formulary Change and Needs Additional Therapy  Gap Closed?: Yes  Intervention Accepted By: Provider: 3  Time Spent (min): 60

## 2022-12-02 NOTE — TELEPHONE ENCOUNTER
Dr. Chelsey Drake, DO,    Your patient is currently enrolled in the Be Well with Diabetes program. After your patient's recent visit with a [facility] Clinical Pharmacist, the below were identified as opportunities to assist with their diabetes management:   Prodigy back up glucometer and supplies  Refill for pen needles  Patient agreeable to re start atorvastatin     All scripts pended if you agree    See pharmacist note dated 12/2/22 for complete details. To remain active in the program, the patient is to meet the requirements and documentation must be provided by pre-established deadlines (see below). Program Requirements  Initial Program Requirements (to be completed by 07/01/2022): Office visit with provider for DM (1st)  A1c (1st)    Ongoing Program Requirements (to be completed by 12/31/2022):   Office visit with provider for DM (2nd)  A1c (2nd)  Diabetes Education if A1c >8%  Lipid panel  Urine microalbumin   Pneumococcal vaccination (if applicable)  Influenza vaccination for Fall 2022  On statin or contraindication  On ACEi/ARB or contraindication    Thank you,  Luis Angel Louise, PharmD, [address] Pharmacist  Department: [phone]

## 2022-12-03 NOTE — PROGRESS NOTES
Pharmacy Progress Note - Diabetes Management    Assessment / Plan:   Diabetes Management:  - Per ADA guidelines, Pt's A1c is not at goal of < 7%. - Based on current CGM trends late evening post prandial hyperglycemia, increase Tresiba to 38 units daily  - Continue metformin 1 gm BID  - Too soon to assess lipid panel.   - Remind patient of upcoming PCP appt 1/3/23. Due for A1c, BMP then   - Reviewed [facility], no records of PPSV23 or Prevnar 20     S/O: Ms. Herbert Mullen is a 46 y.o. female, referred by Dr. Rodrigo Kent DO, with a PMH of T2DM+nephropathy, HLD, OCD, Paroxysmal SVT, was seen today for diabetes management. Patient's last A1c was 9.4% (Sept 2022), 12.3% (July 2022), 10.6% (Oct 2019). Interim update:   Now enrolled in [facility].   Atorvastatin 40 mg daily reinitiated. Still waiting on med delivery     Current anti-hyperglycemic regimen include(s):    - Metformin 500 mg ER - 1 gm BID  - Tresiba 35 units daily     - Atorvastatin 40 mg daily TBD  -  ;  ; non  (July 2022)    ROS:  Today, Pt endorses:  - Symptoms of Hyperglycemia: none  - Symptoms of Hypoglycemia: none    Blood Glucose Monitoring (BGM) or CGM:  Dexcom G6 CGM    14 days:        30 days:        Reports to feeling sx consistent with hypoglycemia this morning at BG < 80. First occurrence in a while. Corrected w/ glucose tablet + candy  BG at this time 148 - stable. Nutrition/Lifestyle Modifications:  Eats 2-3 meals/day  Dinner last night - 1.5 slices of pizza + water -- reports meal time was earlier than usual   Plans to have: pot roast + small serving of steamed potatoes   Occasionally may have popcorn at night  Denies regular sodas, juices intake     The 10-year ASCVD risk score (Avila MCCAIN, et al., 2019) is: 4%       Vitals:   Wt Readings from Last 3 Encounters:   12/05/22 146 lb (66.2 kg)   09/26/22 144 lb 3.2 oz (65.4 kg)   08/15/22 140 lb (63.5 kg)     BP Readings from Last 3 Encounters: 12/05/22 135/81   09/26/22 129/84   08/15/22 (!) 144/88     Pulse Readings from Last 3 Encounters:   12/05/22 93   09/26/22 90   08/15/22 98     Past Medical History:   Diagnosis Date    Diabetes ([address]     Hypercholesterolemia     Menopause 2014    OCD (obsessive compulsive disorder)      Allergies   Allergen Reactions    Erythromycin Other (comments)     Severe stomach pains       Current Outpatient Medications   Medication Sig    Blood-Glucose Meter (Prodigy Autocode Monitor Syst) misc Use to test blood sugars as directed    lancets (Prodigy Lancets) 28 gauge misc Use to test blood sugars once daily or as directed    glucose blood VI test strips (Prodigy No Coding) strip Use to test blood sugars once daily or as directed    Insulin Needles, Disposable, 32 gauge x 5/32\" ndle 1 Pen Needle by SubCUTAneous route daily. Use to give insulin under the skin daily. E11.65    atorvastatin (LIPITOR) 40 mg tablet Take 1 Tablet by mouth daily. alcohol swabs padm Use as directed twice a day as directed    sertraline (ZOLOFT) 100 mg tablet TAKE ONE TABLET BY MOUTH ONE TIME DAILY    insulin degludec Ramirez Reina FlexTouch U-100) 100 unit/mL (3 mL) inpn Inject 35 units under the skin daily  Indications: type 2 diabetes mellitus    metFORMIN ER (GLUCOPHAGE XR) 500 mg tablet Take 2 Tablets by mouth two (2) times a day. metoprolol succinate (TOPROL-XL) 25 mg XL tablet Take 0.5 Tablets by mouth in the morning. Blood-Glucose Meter,Continuous (Dexcom G6 ) misc 1 Each by Does Not Apply route See Admin Instructions. Use to check blood sugar at least 3 times daily    Blood-Glucose Sensor (Dexcom G6 Sensor) tim 1 Each by Does Not Apply route See Admin Instructions. Change every 10 days. Use to monitor blood sugar 4 times daily. Blood-Glucose Transmitter (Dexcom G6 Transmitter) tim 1 Each by Does Not Apply route See Admin Instructions.  Use as directed to monitor blood sugar at least 3 times daily    ondansetron [facility] ODT) 4 mg disintegrating tablet Take 1 Tab by mouth every eight (8) hours as needed for Nausea or Vomiting. No current facility-administered medications for this visit. Lab Results   Component Value Date/Time    Sodium 139 07/01/2022 12:00 AM    Potassium 4.7 07/01/2022 12:00 AM    Chloride 101 07/01/2022 12:00 AM    CO2 24 07/01/2022 12:00 AM    Anion gap 7 05/04/2021 12:54 PM    Glucose 273 (H) 07/01/2022 12:00 AM    BUN 11 07/01/2022 12:00 AM    Creatinine 0.69 07/01/2022 12:00 AM    BUN/Creatinine ratio 16 07/01/2022 12:00 AM    GFR est AA >60 05/04/2021 12:54 PM    GFR est non-AA >60 05/04/2021 12:54 PM    Calcium 9.2 07/01/2022 12:00 AM    Bilirubin, total 0.3 07/01/2022 12:00 AM    Alk.  phosphatase 110 07/01/2022 12:00 AM    Protein, total 6.6 07/01/2022 12:00 AM    Albumin 4.3 07/01/2022 12:00 AM    Globulin 3.6 05/04/2021 12:54 PM    A-G Ratio 1.9 07/01/2022 12:00 AM    ALT (SGPT) 28 07/01/2022 12:00 AM       Lab Results   Component Value Date/Time    Cholesterol, total 212 (H) 07/01/2022 12:00 AM    HDL Cholesterol 48 07/01/2022 12:00 AM    LDL, calculated 133 (H) 07/01/2022 12:00 AM    LDL, calculated 123 (H) 10/28/2019 09:44 AM    VLDL, calculated 31 07/01/2022 12:00 AM    VLDL, calculated 27 10/28/2019 09:44 AM    Triglyceride 174 (H) 07/01/2022 12:00 AM       Lab Results   Component Value Date/Time    WBC 5.7 07/01/2022 12:00 AM    HGB 14.8 07/01/2022 12:00 AM    HCT 46.3 07/01/2022 12:00 AM    PLATELET 992 56/29/4184 12:00 AM    MCV 86 07/01/2022 12:00 AM       Lab Results   Component Value Date/Time    Microalb/Creat ratio (ug/mg creat.) 4.9 10/28/2019 09:45 AM       HbA1c:  Lab Results   Component Value Date/Time    Hemoglobin A1c 12.3 (H) 07/01/2022 12:00 AM    Hemoglobin A1c (POC) 9.4 (A) 09/26/2022 04:38 PM     No components found for: 2     Last Point of Care HGB A1C  Hemoglobin A1c (POC)   Date Value Ref Range Status   09/26/2022 9.4 (A) 4.8 - 5.6 % Final        Estimated Creatinine Clearance: 85.9 mL/min (by C-G formula based on SCr of 0.69 mg/dL). Medication reconciliation was completed during the visit. Medications Discontinued During This Encounter   Medication Reason    Blood-Glucose Sensor (Dexcom G6 Sensor) tim REORDER    Blood-Glucose Transmitter (Dexcom G6 Transmitter) tim REORDER    insulin degludec Creasie Poser FlexTouch U-100) 100 unit/mL (3 mL) inpn      Orders Placed This Encounter    MICROALBUMIN, UR, RAND W/ MICROALB/CREAT RATIO     Standing Status:   Future     Standing Expiration Date:   2023    Blood-Glucose Sensor (Dexcom G6 Sensor) tim     Si Each by Does Not Apply route See Admin Instructions. Change every 10 days. Use to monitor blood sugar 4 times daily. Dispense:  9 Each     Refill:  11    Blood-Glucose Transmitter (Dexcom G6 Transmitter) tim     Si Each by Does Not Apply route See Admin Instructions. Use as directed to monitor blood sugar at least 3 times daily     Dispense:  1 Each     Refill:  2    insulin degludec Creasie Poser FlexTouch U-100) 100 unit/mL (3 mL) inpn     Sig: Inject 38 units under the skin daily  Indications: type 2 diabetes mellitus     Dispense:  45 mL     Refill:  2     Patient's Immunization History:    Immunizations by Immunization family       COVID-19, [place] Vaccine, Vaccine Unspecified 2021 10/13/2021      Hep B Vaccine 2011 2011 10/14/2011     Influenza Vaccine 10/4/2018 10/4/2018 10/28/2019 10/28/2019     2021 2022      MMR 2011       Td 3/10/2011             Patient verbalized understanding of the information presented and all of the patients questions were answered. AVS was handed to the patient. Patient advised to call the office with any additional questions or concerns. Notifications of recommendations will be sent to Dr. Shan Peña DO for review.     Thank you for the consult,  Elvia Crow, PharmD, BCACP, [address] [place] [address] in place:  Yes  Recommendation Provided To: Patient/Caregiver: 8 via In person  Intervention Detail: Discontinued Rx: 3, reason: Duplicate Therapy, Dose Adjustment: 1, reason: Therapy Optimization, Lab(s) Ordered, and New Rx: 3, reason: Needs Additional Therapy  Intervention Accepted By: Patient/Caregiver: 8  Time Spent (min): 45

## 2022-12-05 ENCOUNTER — OFFICE VISIT (OUTPATIENT)
Dept: INTERNAL MEDICINE CLINIC | Age: 52
End: 2022-12-05

## 2022-12-05 VITALS
HEIGHT: 63 IN | OXYGEN SATURATION: 98 % | SYSTOLIC BLOOD PRESSURE: 135 MMHG | DIASTOLIC BLOOD PRESSURE: 81 MMHG | WEIGHT: 146 LBS | HEART RATE: 93 BPM | BODY MASS INDEX: 25.87 KG/M2

## 2022-12-05 DIAGNOSIS — E11.21 TYPE 2 DIABETES WITH NEPHROPATHY (HCC): Primary | ICD-10-CM

## 2022-12-05 RX ORDER — BLOOD-GLUCOSE TRANSMITTER
1 EACH MISCELLANEOUS SEE ADMIN INSTRUCTIONS
Qty: 1 EACH | Refills: 2 | Status: SHIPPED | OUTPATIENT
Start: 2022-12-05

## 2022-12-05 RX ORDER — INSULIN DEGLUDEC 100 U/ML
INJECTION, SOLUTION SUBCUTANEOUS
Qty: 45 ML | Refills: 2 | Status: SHIPPED | OUTPATIENT
Start: 2022-12-05

## 2022-12-05 RX ORDER — BLOOD-GLUCOSE SENSOR
1 EACH MISCELLANEOUS SEE ADMIN INSTRUCTIONS
Qty: 9 EACH | Refills: 11 | Status: SHIPPED | OUTPATIENT
Start: 2022-12-05

## 2022-12-05 NOTE — PATIENT INSTRUCTIONS
Increase Tresiba to 38 units daily  Continue metformin 500 mg ER - two tablets twice daily  Complete micro/albumin labs

## 2022-12-29 ENCOUNTER — APPOINTMENT (OUTPATIENT)
Dept: INTERNAL MEDICINE CLINIC | Age: 52
End: 2022-12-29

## 2022-12-29 DIAGNOSIS — E11.21 TYPE 2 DIABETES WITH NEPHROPATHY (HCC): ICD-10-CM

## 2022-12-29 LAB
CREAT UR-MCNC: 184 MG/DL
MICROALBUMIN UR-MCNC: 1.08 MG/DL
MICROALBUMIN/CREAT UR-RTO: 6 MG/G (ref 0–30)

## 2023-02-14 ENCOUNTER — TELEPHONE (OUTPATIENT)
Dept: PHARMACY | Age: 53
End: 2023-02-14

## 2023-02-14 NOTE — TELEPHONE ENCOUNTER
2023 Annual Pharmacist Visit    **Patient is [facility]**   Called patient to schedule 2023 yearly pharmacist appointment to discuss medications for Diabetes Management Program.    No answer. Left VM on Home TAD: Please call back at [phone] Option #3.      Eulogio Gutierrez, [facility]   Department, toll free: [phone] Option #3

## 2023-02-17 NOTE — TELEPHONE ENCOUNTER
No answer after 2 attempts. Sending [employer]hart for outreach.     For Pharmacy Admin Tracking Only    Program: [address] in place: No  Gap Closed?: No  Time Spent (min): 5

## 2023-03-07 ENCOUNTER — TELEPHONE (OUTPATIENT)
Dept: PHARMACY | Age: 53
End: 2023-03-07

## 2023-03-07 NOTE — TELEPHONE ENCOUNTER
**Patient is [facility]**     2023 Annual Pharmacist Visit    Called patient to schedule 2023 yearly pharmacist appointment to discuss medications for Diabetes Management Program.    No answer. VM full.      Please call back at [phone], option #3         Willa Harding, [address]   Phone: [phone], option #3

## 2023-03-15 NOTE — TELEPHONE ENCOUNTER
Second attempt made to contact patient to schedule 2023 yearly pharmacist appointment to discuss medications for Diabetes Management Program.    No answer. Left VM. Please call back at [phone], option #3. [email or web address] message sent.         Nima Coleman, [address]   Phone: [phone], option #3     For Pharmacy Admin Tracking Only    Program: [address] in place: No  Gap Closed?: No  Time Spent (min): 10

## 2023-03-21 ENCOUNTER — TELEPHONE (OUTPATIENT)
Dept: PHARMACY | Age: 53
End: 2023-03-21

## 2023-03-21 RX ORDER — PEN NEEDLE, DIABETIC 31 GX3/16"
1 NEEDLE, DISPOSABLE MISCELLANEOUS DAILY
Qty: 100 PEN NEEDLE | Refills: 11 | Status: SHIPPED | OUTPATIENT
Start: 2023-03-21

## 2023-03-21 RX ORDER — BLOOD SUGAR DIAGNOSTIC
STRIP MISCELLANEOUS
Qty: 100 STRIP | Refills: 3 | Status: SHIPPED | OUTPATIENT
Start: 2023-03-21

## 2023-03-21 RX ORDER — BLOOD-GLUCOSE METER
EACH MISCELLANEOUS
Qty: 1 EACH | Refills: 0 | Status: SHIPPED | OUTPATIENT
Start: 2023-03-21

## 2023-03-21 RX ORDER — ISOPROPYL ALCOHOL 70 ML/100ML
SWAB TOPICAL
Qty: 200 PAD | Refills: 3 | Status: SHIPPED | OUTPATIENT
Start: 2023-03-21

## 2023-03-21 RX ORDER — ATORVASTATIN CALCIUM 40 MG/1
40 TABLET, FILM COATED ORAL DAILY
Qty: 90 TABLET | Refills: 3 | Status: SHIPPED | OUTPATIENT
Start: 2023-03-21

## 2023-03-21 RX ORDER — LANCETS 28 GAUGE
EACH MISCELLANEOUS
Qty: 100 LANCET | Refills: 3 | Status: SHIPPED | OUTPATIENT
Start: 2023-03-21

## 2023-03-21 NOTE — TELEPHONE ENCOUNTER
[facility] CLINICAL PHARMACY REVIEW - Be Well with Diabetes    Marilu Barrera is a 48 y.o. female enrolled in the [address] [place] [address],[address] Employee Diabetes Program. Patient provided Sariah Almeida with verbal consent to remain in the program for this year. Patient enrolled 11/1/22. Insurance through the following employer: [address] [place] [address],[address] [place] [address]    Works with Aneudy Ricardo PharmD and is seen every few months. Has CPA in place. Will possibly have to add visits manually. Medications:   Current Outpatient Medications   Medication Sig    Blood-Glucose Sensor (Dexcom G6 Sensor) tim 1 Each by Does Not Apply route See Admin Instructions. Change every 10 days. Use to monitor blood sugar 4 times daily. Blood-Glucose Transmitter (Dexcom G6 Transmitter) tim 1 Each by Does Not Apply route See Admin Instructions. Use as directed to monitor blood sugar at least 3 times daily    insulin degludec Rinda Rudder FlexTouch U-100) 100 unit/mL (3 mL) inpn Inject 38 units under the skin daily  Indications: type 2 diabetes mellitus    Blood-Glucose Meter (Prodigy Autocode Monitor Syst) misc Use to test blood sugars as directed    lancets (Prodigy Lancets) 28 gauge misc Use to test blood sugars once daily or as directed    glucose blood VI test strips (Prodigy No Coding) strip Use to test blood sugars once daily or as directed    Insulin Needles, Disposable, 32 gauge x 5/32\" ndle 1 Pen Needle by SubCUTAneous route daily. Use to give insulin under the skin daily. E11.65    atorvastatin (LIPITOR) 40 mg tablet Take 1 Tablet by mouth daily. alcohol swabs padm Use as directed twice a day as directed    sertraline (ZOLOFT) 100 mg tablet TAKE ONE TABLET BY MOUTH ONE TIME DAILY    metFORMIN ER (GLUCOPHAGE XR) 500 mg tablet Take 2 Tablets by mouth two (2) times a day. metoprolol succinate (TOPROL-XL) 25 mg XL tablet Take 0.5 Tablets by mouth in the morning.     Blood-Glucose Meter,Continuous (Dexcom G6 ) misc 1 Each by Does Not Apply route See Admin Instructions. Use to check blood sugar at least 3 times daily    ondansetron (ZOFRAN ODT) 4 mg disintegrating tablet Take 1 Tab by mouth every eight (8) hours as needed for Nausea or Vomiting. No current facility-administered medications for this visit. Current Pharmacy: [facility]  Current testing supplies/frequency: Dexcom G6   Pen needles/syringes: Leader brand    Allergies:   Allergies   Allergen Reactions    Erythromycin Other (comments)     Severe stomach pains      Vitals/Labs:  BP Readings from Last 3 Encounters:   12/05/22 135/81   09/26/22 129/84   08/15/22 (!) 144/88     Lab Results   Component Value Date/Time    Microalbumin/Creat ratio (mg/g creat) 6 12/29/2022 11:54 AM    Microalbumin,urine random 1.08 12/29/2022 11:54 AM     Lab Results   Component Value Date/Time    Hemoglobin A1c 12.3 (H) 07/01/2022 12:00 AM    Hemoglobin A1c 10.6 (H) 10/28/2019 09:44 AM    Hemoglobin A1c 11.3 (H) 07/21/2017 08:34 AM    Hemoglobin A1c (POC) 9.4 (A) 09/26/2022 04:38 PM    Hemoglobin A1c (POC) 8.1 10/04/2018 12:28 PM    Hemoglobin A1c (POC) 10.7 02/20/2018 12:52 PM     Lab Results   Component Value Date/Time    Cholesterol, total 212 (H) 07/01/2022 12:00 AM    HDL Cholesterol 48 07/01/2022 12:00 AM    LDL, calculated 133 (H) 07/01/2022 12:00 AM    LDL, calculated 123 (H) 10/28/2019 09:44 AM    VLDL, calculated 31 07/01/2022 12:00 AM    VLDL, calculated 27 10/28/2019 09:44 AM    Triglyceride 174 (H) 07/01/2022 12:00 AM     ALT (SGPT)   Date Value Ref Range Status   07/01/2022 28 0 - 32 IU/L Final     AST (SGOT)   Date Value Ref Range Status   07/01/2022 24 0 - 40 IU/L Final     The 10-year ASCVD risk score (Avila MCCAIN, et al., 2019) is: 4.3%    Values used to calculate the score:      Age: 48 years      Sex: Female      Is Non- : No      Diabetic: Yes      Tobacco smoker: No      Systolic Blood Pressure: 002 mmHg      Is BP treated: No HDL Cholesterol: 48 mg/dL      Total Cholesterol: 212 mg/dL     Lab Results   Component Value Date/Time    Creatinine 0.69 07/01/2022 12:00 AM     CrCl cannot be calculated (Patient's most recent lab result is older than the maximum 180 days allowed. ).     Lab Results   Component Value Date/Time    GFR est non-AA >60 05/04/2021 12:54 PM    GFR est AA >60 05/04/2021 12:54 PM       Immunizations:  Immunization History   Administered Date(s) Administered    COVID-19, PFIZER PURPLE top, DILUTE for use, (age 15 y+), IM, 30mcg/0.3mL 09/22/2021, 10/13/2021    Hep B Vaccine 04/07/2011, 05/09/2011, 10/14/2011    Influenza Vaccine 10/04/2018, 10/28/2019, 12/28/2021    Influenza, FLUARIX, Sandra Roper (age 10 mo+) AND AFLURIA, (age 1 y+), PF, 0.5mL 10/04/2018, 10/28/2019, 09/26/2022    MMR 04/25/2011    Td 03/10/2011      Social History:  Social History     Tobacco Use    Smoking status: Former    Smokeless tobacco: Never   Substance Use Topics    Alcohol use: Yes     Comment: occassionally     ASSESSMENT:  Initial Program Requirements (Y indicates has completed for the year, N indicates needs to be completed by 07/01/2023):  NO - Provider Visit for DM (1st)  NO - A1c (1st)    Ongoing Program Requirements (Y indicates has completed for the year, N indicates needs to be completed by 12/31/2023):  NO - Provider Visit for DM (2nd)  YES - ACC/diabetes educator visit (if A1c over 8%)- Sees [facility] frequently in person  NO - A1c (2nd)  NO - Lipid panel  NO - Urine microalbumin  NO - Pneumococcal vaccination: [id number]  NO - Influenza vaccination for Fall 2023  YES - Medication adherence over 70%  YES - On statin or contraindication(s)  Atorvastatin  OVERRIDE - On ACEi/ARB or contraindication(s) Normal blood pressure, urinary albumin-to-creatinine ratio, and eGFR     Current medications eligible for copay waiver, up to $600, through [id number]:  - [place], Atorvastatin, Metformin XR  - Dexcom G6     Diabetes Care:   - Glycemic Goal: <7.0%. Is not at blood glucose goal but is on [place] and metformin therapy. Type 2 DM under improving control as evidenced by A1c goinig from >12% to 9.4% in 2022.  - Home blood sugar records:  Wearing Dexcom . Reported readings of 89 and 99 today. She said she was still not sure how to find the average on her machine to check her 2wk and 1 month averages. She reports that her readings have looked much better from what she can recall. - Discussed Abimbola 3- patient was very interested in this option. Would qualify for PA since currently on Dexcom, and would end up costing less. - Any episodes of hypoglycemia? no  - Appropriateness of Insulin Therapy: Currently on basal insulin only. Could consider bolus doses of humalog if post prandial spikes are an issue  - Therapy Optimization:   Could consider GLP1 or SGLT2 if necessary. Will check after the next A1c  - Medication changes since last A1c: Insulin dose increased    PLAN:  - Consideration(s) for provider:   Requested refills- orders for statin, and supplies were printed, not sent electronically in December  - refilled dexcom for 1 month  - DM program gaps identified:   Initial requirements: Provider visit with provider for DM (1st) and A1c (1st)   Ongoing requirements: Provider visit for DM (2nd), A1c (2nd), Lipid panel, Urine microalbumin, Pneumococcal vaccination: Latisha Grossman, and Influenza vaccination for [id number]   - Education to patient: Discussed general issues about diabetes pathophysiology and management., Addressed medication adherence, Overview of Be Well With Diabetes program, Overview of HHP, and Benefit/indication for pneumonia vaccine in patients with diabetes   - Follow up: PCP for identified gaps or as scheduled below and Pharmacist for identified gaps or as scheduled below  - Upcoming appointments:   Future Appointments   Date Time Provider Jose M Ivan   3/21/2023  3:00 PM [facility] SAMIRA Mendez Lenin, PharmD, [address] [facility], toll free: [phone]      For Pharmacy Admin Tracking Only    Program: [address] in place: No  Recommendation Provided To: Provider: 6 via Note to Provider  and Pharmacy: 2  Intervention Detail: New Rx: 2, reason: Needs Additional Therapy and Refill(s) Provided  Intervention Accepted By: Provider: 6 and Pharmacy: 2  Gap Closed?: Yes  Time Spent (min): 45

## 2023-03-22 RX ORDER — METFORMIN HYDROCHLORIDE 500 MG/1
TABLET, EXTENDED RELEASE ORAL
Qty: 360 TABLET | Refills: 1 | Status: SHIPPED | OUTPATIENT
Start: 2023-03-22

## 2023-04-17 ENCOUNTER — PATIENT MESSAGE (OUTPATIENT)
Dept: INTERNAL MEDICINE CLINIC | Age: 53
End: 2023-04-17

## 2023-04-17 ENCOUNTER — TELEPHONE (OUTPATIENT)
Dept: INTERNAL MEDICINE CLINIC | Age: 53
End: 2023-04-17

## 2023-04-17 RX ORDER — INSULIN DEGLUDEC 100 U/ML
INJECTION, SOLUTION SUBCUTANEOUS
Qty: 1 ADJUSTABLE DOSE PRE-FILLED PEN SYRINGE | Refills: 0 | Status: SHIPPED | COMMUNITY
Start: 2023-04-17

## 2023-04-17 NOTE — TELEPHONE ENCOUNTER
Pt states that she will not have enough medication, Harvey Valle for today. Pt is asking for a sample as hers will not be delivered until tomorrow or Wed. Please call pt as soon as possible.

## 2023-04-17 NOTE — TELEPHONE ENCOUNTER
Pharmacy Progress Note - Telephone Call    Ms. Eboni Mitchell 48 y.o. was contacted via an outbound telephone call regarding her previous call today. Unable to leave  at this time. Will send patient a [employer] message. Due for follow up appointment.        Thank you,  Elvia Jeffries, PharmD, BCACP, [facility]    Program: Medical Group  CPA in place: Yes  Recommendation Provided To: Patient/Caregiver: 2 via Telephone and [employer] Inchart Message  Intervention Detail: Patient Access Assistance/Sample Provided  Intervention Accepted By: Patient/Caregiver: 2  Time Spent (min): 5

## 2023-04-17 NOTE — TELEPHONE ENCOUNTER
Pharmacy Progress Note     Entry for Ms. Royce Mccabe 48 y.o. 's Luis Valdez sample. Due for follow up  appt.        Thank you for the consult,  Elvia Escamilla, PharmD, BCACP, [address]    Program: Medical Group  CPA in place: Yes  Recommendation Provided To: Patient/Caregiver: 1 via [email or web address] Message  Intervention Detail: Patient Access Assistance/Sample Provided  Intervention Accepted By: Patient/Caregiver: 1  Gap Closed?:   Time Spent (min): 5

## 2023-05-10 ENCOUNTER — NURSE ONLY (OUTPATIENT)
Age: 53
End: 2023-05-10

## 2023-05-10 ENCOUNTER — CLINICAL DOCUMENTATION (OUTPATIENT)
Dept: PHARMACY | Facility: CLINIC | Age: 53
End: 2023-05-10

## 2023-05-10 DIAGNOSIS — E78.5 HYPERLIPIDEMIA ASSOCIATED WITH TYPE 2 DIABETES MELLITUS (HCC): ICD-10-CM

## 2023-05-10 DIAGNOSIS — E11.21 TYPE 2 DIABETES MELLITUS WITH DIABETIC NEPHROPATHY, WITHOUT LONG-TERM CURRENT USE OF INSULIN (HCC): Primary | ICD-10-CM

## 2023-05-10 DIAGNOSIS — E11.69 HYPERLIPIDEMIA ASSOCIATED WITH TYPE 2 DIABETES MELLITUS (HCC): ICD-10-CM

## 2023-05-10 DIAGNOSIS — E11.21 TYPE 2 DIABETES MELLITUS WITH DIABETIC NEPHROPATHY, WITHOUT LONG-TERM CURRENT USE OF INSULIN (HCC): ICD-10-CM

## 2023-05-10 NOTE — PROGRESS NOTES
Pharmacy Pop Care Documentation:      The application for Randal Morillo for enrollment into the diabetes management program has been reviewed and accepted on 5/10/23.     [address]    Program: [address] in place:  No  Gap Closed?: Yes   Time Spent (min): 5

## 2023-05-10 NOTE — PROGRESS NOTES
Pharmacy Progress Note     Entry for Ms. Blayne Puga 48 y.o. 48 y.o. 's labs. Patient was late for her appt today.  Will wait for results to determine f/up interval.     Orders Placed This Encounter   Procedures    Comprehensive Metabolic Panel     Standing Status:   Future     Standing Expiration Date:   5/10/2024    Lipid Panel     Standing Status:   Future     Standing Expiration Date:   5/10/2024    CBC     Standing Status:   Future     Standing Expiration Date:   5/10/2024    Hemoglobin A1C     Standing Status:   Future     Standing Expiration Date:   5/10/2024           Thank you for the consult,  Trang Mosley, PharmD, BCACP, [address]    Program: Medical Group  CPA in place:  Yes  Recommendation Provided To: Patient/Caregiver: 1 via In person  Intervention Detail: Lab(s) Ordered  Intervention Accepted By: Patient/Caregiver: 1  Gap Closed?:    Time Spent (min): 10

## 2023-05-30 NOTE — PROGRESS NOTES
Pharmacy Progress Note - Diabetes Management    Assessment / Plan:   Diabetes Management:  - Per ADA guidelines, Pt's A1c is not at goal of < 7%. BP close to goal <130/80. SVT --- increase Toprol to 25 mg daily  - Current CGM trends elevated for fasting and post prandial goals. Patient has 3 months supply of Dexcom G6 sensor. Will assess opportunity to transition to [address] [place] [address]. - Given current CGM trends, start Ozempic 0.25 mg weekly x 4 weeks, then increase dose to 0.5 mg weekly. Review GLP1RA's role in hyperglycemic management, MOA, SE profile, and administration technique. - Continue Tresiba 42 units daily for now  - Continue metformin 1 gm BID - emphasize consistent administration  - LDL now < 70. Continue Atorvastatin 40 mg daily     S/O: Ms. Claudia Cervantes is a 48 y.o. female, referred by Dr. De Souza OrDO, with a PMH of T2DM+nephropathy, HLD,  OCD, Paroxysmal SVT, was seen today for diabetes management follow up. Patient's last A1c was 8.4% (May 2023), 9.4% (Sept 2022), 12.3% (July 2022), 10.6% (Oct 2019). Last seen Dec 2022    Current anti-hyperglycemic regimen include(s):    - Metformin 500 mg ER - takes 1 gm BID --- may miss a dose of metformin every so often  - Tresiba 42 units daily AM    - Enrolled in [facility]   - Atorvastatin 40 mg daily   -  HDL 46 ; LDL 58 (May 2023)    ROS:  Today, Pt endorses:  - Symptoms of Hyperglycemia: none  - Symptoms of Hypoglycemia: none    Blood Glucose Monitoring (BGM) or CGM:  Dexcom G6 CGM ; uses reader  Interested in switching to [employer] CGM system   Mobile gloria compatible with Pirq system     14 days:        30 days:          Nutrition/Lifestyle Modifications:  Eats 2 meals/day - breakfast & dinner   Dinner is usually largest meal  Dtr preparing breakfast meals --- usually eggs + pinzon/sausage +/- carb choice + coffee  Wt up 3 lbs since Dec     The 10-year ASCVD risk score (Ramirez DU, et al., 2019) is: 2.7%       Vitals:   Wt Readings from Last 3

## 2023-05-31 ENCOUNTER — PHARMACY VISIT (OUTPATIENT)
Age: 53
End: 2023-05-31

## 2023-05-31 VITALS
HEART RATE: 100 BPM | BODY MASS INDEX: 26.4 KG/M2 | OXYGEN SATURATION: 98 % | SYSTOLIC BLOOD PRESSURE: 133 MMHG | DIASTOLIC BLOOD PRESSURE: 78 MMHG | WEIGHT: 149 LBS | HEIGHT: 63 IN

## 2023-05-31 DIAGNOSIS — E11.21 TYPE 2 DIABETES MELLITUS WITH DIABETIC NEPHROPATHY, WITHOUT LONG-TERM CURRENT USE OF INSULIN (HCC): Primary | ICD-10-CM

## 2023-05-31 RX ORDER — METOPROLOL SUCCINATE 25 MG/1
25 TABLET, EXTENDED RELEASE ORAL DAILY
Qty: 90 TABLET | Refills: 0 | Status: SHIPPED | OUTPATIENT
Start: 2023-05-31

## 2023-05-31 RX ORDER — PROCHLORPERAZINE 25 MG/1
1 SUPPOSITORY RECTAL SEE ADMIN INSTRUCTIONS
COMMUNITY
Start: 2023-03-21

## 2023-05-31 RX ORDER — SEMAGLUTIDE 0.68 MG/ML
0.25 INJECTION, SOLUTION SUBCUTANEOUS
Qty: 3 ML | Refills: 2 | Status: SHIPPED | OUTPATIENT
Start: 2023-05-31

## 2023-05-31 NOTE — PATIENT INSTRUCTIONS
Start Ozempic 0.25 mg weekly for 4 weeks, then increase dose to 0.5 mg weekly  Continue Tresiba 42 units daily for now  Continue metformin 1000 mg twice daily  Increase metoprolol to 25 mg daily

## 2023-06-09 ENCOUNTER — CARE COORDINATION (OUTPATIENT)
Dept: OTHER | Facility: CLINIC | Age: 53
End: 2023-06-09

## 2023-06-09 NOTE — CARE COORDINATION
Ambulatory Care Coordination Note    ACM attempted to reach patient for introduction to Associate Care Management related to DM ACM. HIPAA compliant message left requesting a return phone call at patient convenience.      Plan for follow-up call in 5-7 days      Future Appointments   Date Time Provider Andre Flores   7/5/2023  3:45 PM Thu Tahmina Ramirez, [facility] BS AMB   8/8/2023  8:20 AM Bela Parker III, DO [facility] BS AMB

## 2023-06-15 LAB
ALBUMIN SERPL-MCNC: 4.3 G/DL (ref 3.5–5)
ALBUMIN/GLOB SERPL: 1.5 (ref 1.1–2.2)
ALP SERPL-CCNC: 90 U/L (ref 45–117)
ALT SERPL-CCNC: 50 U/L (ref 12–78)
ANION GAP SERPL CALC-SCNC: 0 MMOL/L (ref 5–15)
AST SERPL-CCNC: 39 U/L (ref 15–37)
BILIRUB SERPL-MCNC: 0.2 MG/DL (ref 0.2–1)
BUN SERPL-MCNC: 15 MG/DL (ref 6–20)
BUN/CREAT SERPL: 18 (ref 12–20)
CALCIUM SERPL-MCNC: 9.6 MG/DL (ref 8.5–10.1)
CHLORIDE SERPL-SCNC: 106 MMOL/L (ref 97–108)
CHOLEST SERPL-MCNC: 139 MG/DL
CO2 SERPL-SCNC: 29 MMOL/L (ref 21–32)
CREAT SERPL-MCNC: 0.82 MG/DL (ref 0.55–1.02)
ERYTHROCYTE [DISTWIDTH] IN BLOOD BY AUTOMATED COUNT: 13.3 % (ref 11.5–14.5)
EST. AVERAGE GLUCOSE BLD GHB EST-MCNC: 194 MG/DL
GLOBULIN SER CALC-MCNC: 2.9 G/DL (ref 2–4)
GLUCOSE SERPL-MCNC: 159 MG/DL (ref 65–100)
HBA1C MFR BLD: 8.4 % (ref 4–5.6)
HCT VFR BLD AUTO: 42.7 % (ref 35–47)
HDLC SERPL-MCNC: 46 MG/DL
HDLC SERPL: 3 (ref 0–5)
HGB BLD-MCNC: 13.1 G/DL (ref 11.5–16)
LDLC SERPL CALC-MCNC: 58.2 MG/DL (ref 0–100)
MCH RBC QN AUTO: 26.5 PG (ref 26–34)
MCHC RBC AUTO-ENTMCNC: 30.7 G/DL (ref 30–36.5)
MCV RBC AUTO: 86.4 FL (ref 80–99)
NRBC # BLD: 0 K/UL (ref 0–0.01)
NRBC BLD-RTO: 0 PER 100 WBC
PLATELET # BLD AUTO: 275 K/UL (ref 150–400)
PMV BLD AUTO: 11.7 FL (ref 8.9–12.9)
POTASSIUM SERPL-SCNC: 4.2 MMOL/L (ref 3.5–5.1)
PROT SERPL-MCNC: 7.2 G/DL (ref 6.4–8.2)
RBC # BLD AUTO: 4.94 M/UL (ref 3.8–5.2)
SODIUM SERPL-SCNC: 135 MMOL/L (ref 136–145)
TRIGL SERPL-MCNC: 174 MG/DL
VLDLC SERPL CALC-MCNC: 34.8 MG/DL
WBC # BLD AUTO: 10.7 K/UL (ref 3.6–11)

## 2023-07-05 ENCOUNTER — PHARMACY VISIT (OUTPATIENT)
Age: 53
End: 2023-07-05

## 2023-07-05 VITALS
DIASTOLIC BLOOD PRESSURE: 81 MMHG | HEART RATE: 95 BPM | SYSTOLIC BLOOD PRESSURE: 119 MMHG | WEIGHT: 149 LBS | HEIGHT: 63 IN | OXYGEN SATURATION: 99 % | BODY MASS INDEX: 26.4 KG/M2

## 2023-07-05 DIAGNOSIS — E11.21 TYPE 2 DIABETES MELLITUS WITH DIABETIC NEPHROPATHY, WITHOUT LONG-TERM CURRENT USE OF INSULIN (HCC): Primary | ICD-10-CM

## 2023-07-05 RX ORDER — BLOOD-GLUCOSE SENSOR
1 EACH MISCELLANEOUS SEE ADMIN INSTRUCTIONS
Qty: 2 EACH | Refills: 11 | Status: SHIPPED | OUTPATIENT
Start: 2023-07-05

## 2023-07-05 RX ORDER — INSULIN DEGLUDEC INJECTION 100 U/ML
38 INJECTION, SOLUTION SUBCUTANEOUS DAILY
Qty: 15 ML | Refills: 2 | Status: SHIPPED | OUTPATIENT
Start: 2023-07-05

## 2023-07-05 RX ORDER — SEMAGLUTIDE 0.68 MG/ML
0.5 INJECTION, SOLUTION SUBCUTANEOUS
Qty: 3 ML | Refills: 2
Start: 2023-07-05

## 2023-07-05 NOTE — PATIENT INSTRUCTIONS
Decrease Tresiba insulin to 38 units daily.  If fasting blood sugar continues to drop below 80, decrease dose to 36 units daily  Increase Ozempic to 0.5 mg weekly Friday  Continue metformin 1000 mg twice daily  Transition to [address] [place] [address]

## 2023-07-10 ENCOUNTER — CARE COORDINATION (OUTPATIENT)
Dept: OTHER | Facility: CLINIC | Age: 53
End: 2023-07-10

## 2023-07-10 ENCOUNTER — TELEPHONE (OUTPATIENT)
Age: 53
End: 2023-07-10

## 2023-07-10 RX ORDER — INSULIN DEGLUDEC INJECTION 100 U/ML
38 INJECTION, SOLUTION SUBCUTANEOUS DAILY
Qty: 1 ADJUSTABLE DOSE PRE-FILLED PEN SYRINGE | Refills: 0 | Status: SHIPPED | COMMUNITY
Start: 2023-07-10

## 2023-07-10 NOTE — CARE COORDINATION
Ambulatory Care Manager [facility]) contacted the patient to introduce the Associate Care Management Program related to DM. ACM reviewed and updated education  patient was agreeable to follow up Care Management calls. Summary Note:   Patient is enrolled in the Be Well With Diabetes program. She was started on Ozempic and has changed some of her life style choices, which have improved her symptoms and levels. She is also switching over to the [place] 3 system. Her current A1C is 8.4 down from 12.3 in July of last year. Patient has an appointment with her PCP at the beginning of August. Her PCP is currently managing her Diabetes. Patient does feel that she could benefit from nutrition counseling. Provided Education:  Discussed red flags and appropriate site of care based on symptoms and resources available to patient including: PCP. Provided the following associate/dependent related resources: Be Well With Diabetes Pharmacy Team: [phone], Option-3    Importance and benefits of: Follow up with PCP and specialist, medication adherence, self monitoring and reporting of symptoms. Plan:  ACM provided contact information for future needs. Plan for follow-up call in 7-10 days based on severity of symptoms and risk factors. Plan for next call: Review and update: medication  and goals    Follow up on: resources  Patient  verbalized understanding and is agreeable to follow up call.

## 2023-07-10 NOTE — TELEPHONE ENCOUNTER
Regarding medication:  Insulin    Problem:  Patient used last this morning  Mail order has not yet arrived (unsure when it will arrive as states has not done tracking yet on it)    Suggested potential alternate options:  Are there any samples available? Callback number:  [phone]              Caller confirms readback of documented phone/fax number(s) as correct.

## 2023-07-12 ENCOUNTER — CARE COORDINATION (OUTPATIENT)
Dept: OTHER | Facility: CLINIC | Age: 53
End: 2023-07-12

## 2023-07-12 NOTE — CARE COORDINATION
ACM contacted the patient to follow up on progress, discuss new issues or concerns, and reinforce/provide patient education. Summary Note: Sent the patient provider information for Nutrition/Registered Dietician via email. Will await a response and follow up as needed. Patient instructed that it was OK to get the hepatitis B vaccine series

## 2023-07-26 ENCOUNTER — CARE COORDINATION (OUTPATIENT)
Dept: OTHER | Facility: CLINIC | Age: 53
End: 2023-07-26

## 2023-07-26 NOTE — CARE COORDINATION
Ambulatory Care Coordination Note    ACM attempted to reach patient for Associate Care Management related to DM. No voicemail available, , will continue to outreach.      Plan for follow-up call in 10-14 days    Future Appointments   Date Time Provider 4600  46[facility]   8/8/2023  8:20 AM Boy Dunn III [facility] BS AMB

## 2023-07-28 ENCOUNTER — TELEPHONE (OUTPATIENT)
Age: 53
End: 2023-07-28

## 2023-07-28 NOTE — TELEPHONE ENCOUNTER
Pharmacy called in to request we place a prior auth on Freestyle aquiles 3 through cover my meds.  Pharmacist says she submitted the PA on cover my meds but we need to attached chart notes showing dexcom

## 2023-08-08 ENCOUNTER — OFFICE VISIT (OUTPATIENT)
Age: 53
End: 2023-08-08
Payer: COMMERCIAL

## 2023-08-08 VITALS
OXYGEN SATURATION: 98 % | HEART RATE: 92 BPM | DIASTOLIC BLOOD PRESSURE: 67 MMHG | BODY MASS INDEX: 25.76 KG/M2 | RESPIRATION RATE: 14 BRPM | HEIGHT: 63 IN | TEMPERATURE: 98.2 F | SYSTOLIC BLOOD PRESSURE: 103 MMHG | WEIGHT: 145.4 LBS

## 2023-08-08 DIAGNOSIS — F41.9 ANXIETY AND DEPRESSION: ICD-10-CM

## 2023-08-08 DIAGNOSIS — F32.A ANXIETY AND DEPRESSION: ICD-10-CM

## 2023-08-08 DIAGNOSIS — Z12.31 VISIT FOR SCREENING MAMMOGRAM: ICD-10-CM

## 2023-08-08 DIAGNOSIS — E11.69 HYPERLIPIDEMIA ASSOCIATED WITH TYPE 2 DIABETES MELLITUS (HCC): ICD-10-CM

## 2023-08-08 DIAGNOSIS — E78.5 HYPERLIPIDEMIA ASSOCIATED WITH TYPE 2 DIABETES MELLITUS (HCC): ICD-10-CM

## 2023-08-08 DIAGNOSIS — I10 PRIMARY HYPERTENSION: ICD-10-CM

## 2023-08-08 DIAGNOSIS — E11.8 DM (DIABETES MELLITUS), TYPE 2 WITH COMPLICATIONS (HCC): Primary | ICD-10-CM

## 2023-08-08 DIAGNOSIS — Z12.11 SCREEN FOR COLON CANCER: ICD-10-CM

## 2023-08-08 PROBLEM — I47.1 PAROXYSMAL SVT (SUPRAVENTRICULAR TACHYCARDIA) (HCC): Status: RESOLVED | Noted: 2018-10-04 | Resolved: 2023-08-08

## 2023-08-08 PROBLEM — I47.10 PAROXYSMAL SVT (SUPRAVENTRICULAR TACHYCARDIA): Status: RESOLVED | Noted: 2018-10-04 | Resolved: 2023-08-08

## 2023-08-08 LAB — HBA1C MFR BLD: 7.2 %

## 2023-08-08 PROCEDURE — 90715 TDAP VACCINE 7 YRS/> IM: CPT | Performed by: INTERNAL MEDICINE

## 2023-08-08 PROCEDURE — 83036 HEMOGLOBIN GLYCOSYLATED A1C: CPT | Performed by: INTERNAL MEDICINE

## 2023-08-08 PROCEDURE — 3078F DIAST BP <80 MM HG: CPT | Performed by: INTERNAL MEDICINE

## 2023-08-08 PROCEDURE — 99396 PREV VISIT EST AGE 40-64: CPT | Performed by: INTERNAL MEDICINE

## 2023-08-08 PROCEDURE — 90471 IMMUNIZATION ADMIN: CPT | Performed by: INTERNAL MEDICINE

## 2023-08-08 PROCEDURE — 3074F SYST BP LT 130 MM HG: CPT | Performed by: INTERNAL MEDICINE

## 2023-08-08 SDOH — ECONOMIC STABILITY: INCOME INSECURITY: HOW HARD IS IT FOR YOU TO PAY FOR THE VERY BASICS LIKE FOOD, HOUSING, MEDICAL CARE, AND HEATING?: NOT HARD AT ALL

## 2023-08-08 SDOH — ECONOMIC STABILITY: FOOD INSECURITY: WITHIN THE PAST 12 MONTHS, YOU WORRIED THAT YOUR FOOD WOULD RUN OUT BEFORE YOU GOT MONEY TO BUY MORE.: NEVER TRUE

## 2023-08-08 SDOH — ECONOMIC STABILITY: FOOD INSECURITY: WITHIN THE PAST 12 MONTHS, THE FOOD YOU BOUGHT JUST DIDN'T LAST AND YOU DIDN'T HAVE MONEY TO GET MORE.: NEVER TRUE

## 2023-08-08 SDOH — ECONOMIC STABILITY: HOUSING INSECURITY
IN THE LAST 12 MONTHS, WAS THERE A TIME WHEN YOU DID NOT HAVE A STEADY PLACE TO SLEEP OR SLEPT IN A SHELTER (INCLUDING NOW)?: NO

## 2023-08-08 ASSESSMENT — PATIENT HEALTH QUESTIONNAIRE - PHQ9
1. LITTLE INTEREST OR PLEASURE IN DOING THINGS: 0
SUM OF ALL RESPONSES TO PHQ QUESTIONS 1-9: 0
SUM OF ALL RESPONSES TO PHQ QUESTIONS 1-9: 0
2. FEELING DOWN, DEPRESSED OR HOPELESS: 0
SUM OF ALL RESPONSES TO PHQ9 QUESTIONS 1 & 2: 0
SUM OF ALL RESPONSES TO PHQ QUESTIONS 1-9: 0
SUM OF ALL RESPONSES TO PHQ QUESTIONS 1-9: 0

## 2023-08-08 NOTE — PROGRESS NOTES
Alo Pimentel is a 48 y.o. female who presents for evaluation of annual cpe. Last seen by me 2022 in cpe. She has been working hard with our pharmd since then on her diabetes, and is making great progress. She has a CGM, and her a1c is down to 7.2 today. She feels good. Did not do any of her maintenance items though.       ROS:  Constitutional: negative for fevers, chills, anorexia and weight loss  Eyes:   negative for visual disturbance and irritation  ENT:   negative for tinnitus,sore throat,nasal congestion,ear pain,hoarseness  Respiratory:  negative for cough, hemoptysis, dyspnea,wheezing  CV:   negative for chest pain, palpitations, lower extremity edema  GI:   negative for nausea, vomiting, diarrhea, abdominal pain,melena  Genitourinary: negative for frequency, dysuria and hematuria  Musculoskel: negative for myalgias, arthralgias, back pain, muscle weakness, joint pain  Neurological:  negative for headaches, dizziness, focal weakness, numbness  Psychiatric:     Negative for depression or anxiety      Past Medical History:   Diagnosis Date    Diabetes ([address])     Hypercholesterolemia     Menopause     OCD (obsessive compulsive disorder)        Past Surgical History:   Procedure Laterality Date     SECTION       and     CHOLECYSTECTOMY      OTHER SURGICAL HISTORY      teeth extracted; has dentures       Family History   Problem Relation Age of Onset    Cancer Mother     Hypertension Mother     COPD Mother     Hypertension Father     Cancer Father     Diabetes Maternal Aunt     Diabetes Maternal Uncle     Cancer Sister        Social History     Socioeconomic History    Marital status:      Spouse name: Not on file    Number of children: Not on file    Years of education: Not on file    Highest education level: Not on file   Occupational History    Not on file   Tobacco Use    Smoking status: Former    Smokeless tobacco: Never   Substance and Sexual Activity    Alcohol

## 2023-08-08 NOTE — PROGRESS NOTES
1. \"Have you been to the ER, urgent care clinic since your last visit? Hospitalized since your last visit? \" No    2. \"Have you seen or consulted any other health care providers outside of the [address] [place] [address] since your last visit? \" No     3. For patients aged 43-73: Has the patient had a colonoscopy / FIT/ Cologuard? No      If the patient is female:    4. For patients aged 43-66: Has the patient had a mammogram within the past 2 years? No      5. For patients aged 21-65: Has the patient had a pap smear?  No

## 2023-08-09 ENCOUNTER — CARE COORDINATION (OUTPATIENT)
Dept: OTHER | Facility: CLINIC | Age: 53
End: 2023-08-09

## 2023-08-09 NOTE — CARE COORDINATION
Ambulatory Care Coordination Note    ACM attempted to reach patient for care management follow up call regarding DM. HIPAA compliant message left requesting a return phone call at patient convenience. Plan for follow-up call in 10-14 days    No future appointments.

## 2023-08-14 ENCOUNTER — CLINICAL DOCUMENTATION (OUTPATIENT)
Dept: PHARMACY | Facility: CLINIC | Age: 53
End: 2023-08-14

## 2023-08-14 NOTE — PROGRESS NOTES
Pharmacy Pop Care Documentation:     AVS received for required office visit on:  7/5 and 5/31- working with [facility] Trang Glover MD under CPA with PCP. Pt sees PCP annually and follows up with [facility] who works under [employer] with [employer],  . Pt has completed requirements related to Provider visits for 2023.     Chip Paredes, PharmD,  [facility]  Department, toll free: [phone] [facility] Only    Program: [address] [place] [address]  Time Spent (min): 10

## 2023-08-23 ENCOUNTER — CARE COORDINATION (OUTPATIENT)
Dept: OTHER | Facility: CLINIC | Age: 53
End: 2023-08-23

## 2023-09-06 ENCOUNTER — CARE COORDINATION (OUTPATIENT)
Dept: OTHER | Facility: CLINIC | Age: 53
End: 2023-09-06

## 2023-09-11 RX ORDER — SEMAGLUTIDE 0.68 MG/ML
0.5 INJECTION, SOLUTION SUBCUTANEOUS
Qty: 3 ML | Refills: 2 | Status: SHIPPED | OUTPATIENT
Start: 2023-09-11

## 2023-09-11 NOTE — TELEPHONE ENCOUNTER
PCP: Efren Altamirano,     Last appt: 8/8/2023  No future appointments.     Requested Prescriptions     Pending Prescriptions Disp Refills    Semaglutide,0.25 or 0.5MG/DOS, (OZEMPIC, 0.25 OR 0.5 MG/DOSE,) 2 MG/3ML SOPN 3 mL 2     Sig: Inject 0.5 mg into the skin every 7 days

## 2023-09-11 NOTE — TELEPHONE ENCOUNTER
Patient states she needs to get refill done to [address] from us for her Semaglutide,0.25 or 0.5MG/DOS, (OZEMPIC, 0.25 OR 0.5 MG/DOSE,) 2 MG/3ML SOPN. Please call if any questions.  Thank you      Patient reminded of [address] around on refills

## 2023-09-12 ENCOUNTER — CARE COORDINATION (OUTPATIENT)
Dept: OTHER | Facility: CLINIC | Age: 53
End: 2023-09-12

## 2023-09-12 NOTE — CARE COORDINATION
Ambulatory Care Coordination Note    ACM attempted third and final call to patient for introduction to Associate Care Management. HIPAA compliant message left requesting a return phone call at patient convenience. Final Unable to Reach Letter sent to patient via My Chart. No further outreach scheduled with this ACM, patient has been provided with this ACM's contact information. ACM will sign off care team at this time. No future appointments.

## 2023-09-26 ENCOUNTER — CARE COORDINATION (OUTPATIENT)
Dept: OTHER | Facility: CLINIC | Age: 53
End: 2023-09-26

## 2023-10-10 RX ORDER — METOPROLOL SUCCINATE 25 MG/1
25 TABLET, EXTENDED RELEASE ORAL DAILY
Qty: 90 TABLET | Refills: 3 | Status: SHIPPED | OUTPATIENT
Start: 2023-10-10

## 2023-10-10 RX ORDER — INSULIN DEGLUDEC INJECTION 100 U/ML
INJECTION, SOLUTION SUBCUTANEOUS
Qty: 15 ML | Refills: 5 | Status: SHIPPED | OUTPATIENT
Start: 2023-10-10 | End: 2023-10-13 | Stop reason: SDUPTHER

## 2023-10-12 ENCOUNTER — TELEPHONE (OUTPATIENT)
Age: 53
End: 2023-10-12

## 2023-10-12 NOTE — TELEPHONE ENCOUNTER
Patient called in stating her refill on Tresiba will not arrive from mail order pharmacy until next week and would like to know if we have samples

## 2023-10-13 DIAGNOSIS — E11.8 DM (DIABETES MELLITUS), TYPE 2 WITH COMPLICATIONS (HCC): Primary | ICD-10-CM

## 2023-10-13 RX ORDER — INSULIN DEGLUDEC INJECTION 100 U/ML
INJECTION, SOLUTION SUBCUTANEOUS
Qty: 15 ML | Refills: 0 | Status: SHIPPED | COMMUNITY
Start: 2023-10-13

## 2023-10-13 NOTE — TELEPHONE ENCOUNTER
Pt states she is out of the [place]. She will have to have some today as mail order is behind. Please call pt right away to advise.

## 2023-10-18 ENCOUNTER — TELEPHONE (OUTPATIENT)
Dept: PHARMACY | Facility: CLINIC | Age: 53
End: 2023-10-18

## 2023-10-18 NOTE — TELEPHONE ENCOUNTER
As of 10/5/23 patient has used $595 of the maximum of $600 a year in waived co pays for specific medications and pharmacy-related supplies through the [employer] [employer] for the DM Program.    Patient currently enrolled in the DM Program and is nearing the maximum of $600 a year in waived co pays for specific medications and pharmacy-related supplies through the [address] [employer]. Courtesy call placed to patient to advise them of the above information. Patient unavailable at the time of call. VM full. Maximum waived co pays for the DM Program has almost been met. Once maximum has been reached, they will begin to be charged their co-payment once again. [employer] message sent.      Emeli Alex, [address]   Phone: [phone], option #3       For Pharmacy Admin Tracking Only    Program: Halie in place:  No  Time Spent (min): 5

## 2023-11-09 ENCOUNTER — PATIENT MESSAGE (OUTPATIENT)
Dept: PHARMACY | Facility: CLINIC | Age: 53
End: 2023-11-09

## 2023-11-09 ENCOUNTER — TELEPHONE (OUTPATIENT)
Dept: PHARMACY | Facility: CLINIC | Age: 53
End: 2023-11-09

## 2023-11-09 NOTE — TELEPHONE ENCOUNTER
As of 11/06/23 patient has used $700 of the maximum of $600 a year in waived co pays for specific medications and pharmacy-related supplies through the [employer] for the DM Program.    Patient currently enrolled in the DM Program and has used all of the maximum of $600 a year in waived co pays for specific medications and pharmacy-related supplies through the [employer] [employer]. Patient is also still missing the following requirements due for the DM Program by 12/31/23:  Urine Microalbumin  Pneumonia Vaccine     Courtesy call placed to patient at home/mobile number to advise them of the above information. There was no answer and the TAD was full and would not accept any further messages. There are no other contact phone numbers on file for this patient. [employer] message sent to patient.     Paul Escalera   Department, toll free: [phone] Option #3    For Pharmacy Admin Tracking Only    Program: Halie in place:  No  Gap Closed?: Yes   Time Spent (min): 5

## 2023-11-13 RX ORDER — SEMAGLUTIDE 0.68 MG/ML
INJECTION, SOLUTION SUBCUTANEOUS
Qty: 3 ML | Refills: 2 | Status: SHIPPED | OUTPATIENT
Start: 2023-11-13

## 2023-11-20 NOTE — TELEPHONE ENCOUNTER
[employer] message not read by patient. Letter sent.     For Pharmacy Admin Tracking Only    Program: Halie in place:  No  Gap Closed?: Yes   Time Spent (min): 5

## 2023-11-22 RX ORDER — METFORMIN HYDROCHLORIDE 500 MG/1
1000 TABLET, EXTENDED RELEASE ORAL 2 TIMES DAILY
Qty: 360 TABLET | Refills: 3 | Status: SHIPPED | OUTPATIENT
Start: 2023-11-22

## 2023-12-07 ENCOUNTER — TELEPHONE (OUTPATIENT)
Dept: PHARMACY | Facility: CLINIC | Age: 53
End: 2023-12-07

## 2023-12-07 ENCOUNTER — PATIENT MESSAGE (OUTPATIENT)
Dept: PHARMACY | Facility: CLINIC | Age: 53
End: 2023-12-07

## 2023-12-07 NOTE — TELEPHONE ENCOUNTER
Patient is currently enrolled in the Be Well with Diabetes Program.   Our records indicate that we are missing documentation of the requirement(s) listed below. If these requirement(s) are not completed by December 31, 2023, the patient may be disenrolled from the program:     Pneumonia Vaccination      Attempt made to contact patient at home/mobile number. Patient unavailable at the time of the call. No message could be left on the TAD because the mail box was full and would not accept any further messages. Previous [employer] message from 10/06/23 was read. [employer] message sent to patient. No further patient outreach planned at this time.     Paul Ascencio   Department, toll free: [phone] Option #3    For Pharmacy Admin Tracking Only    Program: Halie in place:  No  Gap Closed?: No   Time Spent (min): 5

## 2023-12-14 NOTE — TELEPHONE ENCOUNTER
[employer] message not read by patient. Letter mailed to patient with [employer] message information.     For Pharmacy Admin Tracking Only    Program: Halie in place:  No    Gap Closed?: No   Time Spent (min): 5

## 2024-01-18 RX ORDER — SEMAGLUTIDE 0.68 MG/ML
INJECTION, SOLUTION SUBCUTANEOUS
Qty: 3 ML | Refills: 2 | Status: SHIPPED | OUTPATIENT
Start: 2024-01-18

## 2024-01-19 ENCOUNTER — TELEPHONE (OUTPATIENT)
Dept: PHARMACY | Facility: CLINIC | Age: 54
End: 2024-01-19

## 2024-01-19 NOTE — TELEPHONE ENCOUNTER
**Patient is [facility]     Called patient to schedule 2024 yearly pharmacist appointment to discuss medications for Diabetes Management Program.     Spoke to patient and appointment scheduled for 1.30.24 @ 3:00 PM.     Shasha Sadler CphT   [facility]   Clinical Pharmacy    Department, toll free: [phone] Option #3      For Pharmacy Admin Tracking Only    Program: [employer]  CPA in place:  No  Recommendation Provided To: Patient/Caregiver: 1 via Telephone  Intervention Detail: Scheduled Appointment  Intervention Accepted By: Patient/Caregiver: 1  Gap Closed?: Yes   Time Spent (min): 10

## 2024-01-30 ENCOUNTER — TELEPHONE (OUTPATIENT)
Dept: PHARMACY | Facility: CLINIC | Age: 54
End: 2024-01-30

## 2024-01-30 NOTE — TELEPHONE ENCOUNTER
[facility] CLINICAL PHARMACY REVIEW - Be Well with Diabetes ([facility])    Teodora Warren is a 54 y.o. female enrolled in the [facility] Employee Diabetes Program. Patient provided writer with verbal consent to remain in the program for this year. Patient enrolled 11/1/22.    Medications:  Current Outpatient Medications   Medication Instructions    atorvastatin (LIPITOR) 40 mg, Oral, DAILY    Continuous Blood Gluc Sensor (FREESTYLE NUSRAT 3 SENSOR) MISC 1 each, Does not apply, SEE ADMIN INSTRUCTIONS, Replaces Dexcom    Insulin Degludec (TRESIBA FLEXTOUCH) 100 UNIT/ML SOPN INJECT 38 UNITS UNDER THE SKIN DAILY    metFORMIN (GLUCOPHAGE-XR) 1,000 mg, Oral, 2 TIMES DAILY    metoprolol succinate (TOPROL XL) 25 mg, Oral, DAILY    OZEMPIC, 0.25 OR 0.5 MG/DOSE, 2 MG/3ML SOPN INJECT 0.5MG UNDER THE SKIN ONCE WEEKLY      sertraline (ZOLOFT) 100 mg, Oral, DAILY     Pharmacy and Supplies Information  Current Pharmacy: [facility]  Current Testing supplies: [employer], Freestyle Nusrat 3, discussed insurance prefers Dexcom G7. She states she has been made aware of this.  She does not need writer to reach out to provider at this time.    Allergies:  Allergies   Allergen Reactions    Erythromycin Other (See Comments)     Severe stomach pains      Vitals/Labs:  BP Readings from Last 3 Encounters:   08/08/23 103/67   07/05/23 119/81   05/31/23 133/78     No results found for: \"LZCF05NHB\"   Lab Results   Component Value Date    MALBCR 6 12/29/2022     Lab Results   Component Value Date    LABA1C 8.4 (H) 05/10/2023    LABA1C 12.3 (H) 07/01/2022    LABA1C 10.6 (H) 10/28/2019     Hemoglobin A1C, POC   Date Value Ref Range Status   08/08/2023 7.2 % Final     Lab Results   Component Value Date    CHOL 139 05/10/2023    TRIG 174 (H) 05/10/2023    HDL 46 05/10/2023    LDLCALC 58.2 05/10/2023     ALT   Date Value Ref Range Status   05/10/2023 50 12 - 78 U/L Final     AST   Date Value Ref Range Status   05/10/2023

## 2024-03-29 ENCOUNTER — PATIENT MESSAGE (OUTPATIENT)
Dept: PHARMACY | Facility: CLINIC | Age: 54
End: 2024-03-29

## 2024-03-29 ENCOUNTER — CLINICAL DOCUMENTATION (OUTPATIENT)
Dept: PHARMACY | Facility: CLINIC | Age: 54
End: 2024-03-29

## 2024-03-29 NOTE — PROGRESS NOTES
1st Quarterly Reminder sent to patient for the DM Program - See Mychart message or Letter for more information.      Tomi Vargas [facility]  Clinical Pharmacy   Phone: [phone], Option #3     For Pharmacy Admin Tracking Only    Program: [employer]  CPA in place:  No  Gap Closed?: Yes   Time Spent (min): 5

## 2024-04-09 NOTE — TELEPHONE ENCOUNTER
Anne not read. Sending letter.       Tomi Vargas [facility]  Clinical Pharmacy   Phone: [phone], Option #3       For Pharmacy Admin Tracking Only    Program: [employer]  CPA in place:  No  Gap Closed?: Yes   Time Spent (min): 5

## 2024-04-30 RX ORDER — ATORVASTATIN CALCIUM 40 MG/1
40 TABLET, FILM COATED ORAL DAILY
Qty: 90 TABLET | Refills: 3 | Status: SHIPPED | OUTPATIENT
Start: 2024-04-30

## 2024-06-26 ENCOUNTER — TELEPHONE (OUTPATIENT)
Dept: PHARMACY | Facility: CLINIC | Age: 54
End: 2024-06-26

## 2024-06-26 ENCOUNTER — TELEPHONE (OUTPATIENT)
Age: 54
End: 2024-06-26

## 2024-06-26 NOTE — TELEPHONE ENCOUNTER
2nd Quarterly Reminder sent to patient for the DM Program - See [employer] message or Letter for more information.    Called patient with outstanding requirements for the Be Well With Diabetes Program.  No answer at the home/mobile number and unable to leave a message due to TAD being full and not accepting any further messages.    Previous [employer] messages have not been read by patient.  Letter mailed to address on file.    Bhavna Stern [facility]  Clinical Pharmacy   Department, toll free: [phone] Option #3    For Pharmacy Admin Tracking Only    Program: [employer]  CPA in place:  No  Gap Closed?: Yes   Time Spent (min): 5

## 2024-06-26 NOTE — TELEPHONE ENCOUNTER
-- DO NOT REPLY / DO NOT REPLY ALL --  -- This inbox is not monitored  -- Message is from Engagement Center Operations (ECO) --    General Patient Message:     Patient called in to provide the reference number from her [employer], [facility] for the change of provider confirmation per office request. The reference number is # [id number]      Caller Information         Type Contact Phone/Fax    05/25/2024 05:19 PM CDT Phone (Incoming) Ron Mendez (Self) [phone] (M)            Alternative phone number: None    Can a detailed message be left? Yes - Voicemail      Patient has been advised the message will be addressed within 2-3 business days.             Attempted to call patient to schedule her Mammogram but patients Mailbox is full can't leave message will send message via my chart

## 2024-07-30 DIAGNOSIS — E11.8 DM (DIABETES MELLITUS), TYPE 2 WITH COMPLICATIONS (HCC): ICD-10-CM

## 2024-07-30 RX ORDER — INSULIN DEGLUDEC 100 U/ML
INJECTION, SOLUTION SUBCUTANEOUS
Qty: 15 ML | Refills: 0 | Status: SHIPPED | OUTPATIENT
Start: 2024-07-30

## 2024-09-05 NOTE — TELEPHONE ENCOUNTER
----- Message from Hugo Bowen sent at 3/25/2022  1:51 PM EDT -----  Regarding: Refill  I have an appointment scheduled. Can my zoloft be refilled please? MD//JEANE/FRANK

## 2024-09-11 ENCOUNTER — TELEPHONE (OUTPATIENT)
Age: 54
End: 2024-09-11

## 2024-09-11 DIAGNOSIS — E11.8 DM (DIABETES MELLITUS), TYPE 2 WITH COMPLICATIONS (HCC): ICD-10-CM

## 2024-09-11 RX ORDER — INSULIN DEGLUDEC 100 U/ML
INJECTION, SOLUTION SUBCUTANEOUS
Qty: 15 ML | Refills: 2 | Status: SHIPPED | OUTPATIENT
Start: 2024-09-11

## 2024-09-13 ENCOUNTER — OFFICE VISIT (OUTPATIENT)
Age: 54
End: 2024-09-13
Payer: COMMERCIAL

## 2024-09-13 VITALS
SYSTOLIC BLOOD PRESSURE: 138 MMHG | RESPIRATION RATE: 14 BRPM | HEIGHT: 63 IN | HEART RATE: 100 BPM | BODY MASS INDEX: 26.08 KG/M2 | WEIGHT: 147.2 LBS | OXYGEN SATURATION: 100 % | TEMPERATURE: 96.8 F | DIASTOLIC BLOOD PRESSURE: 82 MMHG

## 2024-09-13 DIAGNOSIS — Z12.11 SCREEN FOR COLON CANCER: ICD-10-CM

## 2024-09-13 DIAGNOSIS — I10 PRIMARY HYPERTENSION: ICD-10-CM

## 2024-09-13 DIAGNOSIS — E11.8 DM (DIABETES MELLITUS), TYPE 2 WITH COMPLICATIONS (HCC): ICD-10-CM

## 2024-09-13 DIAGNOSIS — Z12.31 VISIT FOR SCREENING MAMMOGRAM: ICD-10-CM

## 2024-09-13 DIAGNOSIS — E78.5 HYPERLIPIDEMIA ASSOCIATED WITH TYPE 2 DIABETES MELLITUS (HCC): ICD-10-CM

## 2024-09-13 DIAGNOSIS — Z00.00 ANNUAL PHYSICAL EXAM: Primary | ICD-10-CM

## 2024-09-13 DIAGNOSIS — E11.69 HYPERLIPIDEMIA ASSOCIATED WITH TYPE 2 DIABETES MELLITUS (HCC): ICD-10-CM

## 2024-09-13 PROCEDURE — 3075F SYST BP GE 130 - 139MM HG: CPT | Performed by: INTERNAL MEDICINE

## 2024-09-13 PROCEDURE — 99396 PREV VISIT EST AGE 40-64: CPT | Performed by: INTERNAL MEDICINE

## 2024-09-13 PROCEDURE — 3079F DIAST BP 80-89 MM HG: CPT | Performed by: INTERNAL MEDICINE

## 2024-09-13 RX ORDER — BLOOD-GLUCOSE SENSOR
EACH MISCELLANEOUS
Qty: 6 EACH | Refills: 3 | Status: SHIPPED | OUTPATIENT
Start: 2024-09-13

## 2024-09-13 SDOH — ECONOMIC STABILITY: FOOD INSECURITY: WITHIN THE PAST 12 MONTHS, THE FOOD YOU BOUGHT JUST DIDN'T LAST AND YOU DIDN'T HAVE MONEY TO GET MORE.: NEVER TRUE

## 2024-09-13 SDOH — ECONOMIC STABILITY: INCOME INSECURITY: HOW HARD IS IT FOR YOU TO PAY FOR THE VERY BASICS LIKE FOOD, HOUSING, MEDICAL CARE, AND HEATING?: NOT HARD AT ALL

## 2024-09-13 SDOH — ECONOMIC STABILITY: FOOD INSECURITY: WITHIN THE PAST 12 MONTHS, YOU WORRIED THAT YOUR FOOD WOULD RUN OUT BEFORE YOU GOT MONEY TO BUY MORE.: NEVER TRUE

## 2024-09-13 ASSESSMENT — PATIENT HEALTH QUESTIONNAIRE - PHQ9
5. POOR APPETITE OR OVEREATING: NOT AT ALL
SUM OF ALL RESPONSES TO PHQ QUESTIONS 1-9: 0
4. FEELING TIRED OR HAVING LITTLE ENERGY: NOT AT ALL
SUM OF ALL RESPONSES TO PHQ QUESTIONS 1-9: 0
6. FEELING BAD ABOUT YOURSELF - OR THAT YOU ARE A FAILURE OR HAVE LET YOURSELF OR YOUR FAMILY DOWN: NOT AT ALL
8. MOVING OR SPEAKING SO SLOWLY THAT OTHER PEOPLE COULD HAVE NOTICED. OR THE OPPOSITE, BEING SO FIGETY OR RESTLESS THAT YOU HAVE BEEN MOVING AROUND A LOT MORE THAN USUAL: NOT AT ALL
1. LITTLE INTEREST OR PLEASURE IN DOING THINGS: NOT AT ALL
3. TROUBLE FALLING OR STAYING ASLEEP: NOT AT ALL
SUM OF ALL RESPONSES TO PHQ9 QUESTIONS 1 & 2: 0
2. FEELING DOWN, DEPRESSED OR HOPELESS: NOT AT ALL
SUM OF ALL RESPONSES TO PHQ QUESTIONS 1-9: 0
7. TROUBLE CONCENTRATING ON THINGS, SUCH AS READING THE NEWSPAPER OR WATCHING TELEVISION: NOT AT ALL
9. THOUGHTS THAT YOU WOULD BE BETTER OFF DEAD, OR OF HURTING YOURSELF: NOT AT ALL
10. IF YOU CHECKED OFF ANY PROBLEMS, HOW DIFFICULT HAVE THESE PROBLEMS MADE IT FOR YOU TO DO YOUR WORK, TAKE CARE OF THINGS AT HOME, OR GET ALONG WITH OTHER PEOPLE: NOT DIFFICULT AT ALL
SUM OF ALL RESPONSES TO PHQ QUESTIONS 1-9: 0

## 2024-09-20 ENCOUNTER — LAB (OUTPATIENT)
Age: 54
End: 2024-09-20

## 2024-09-20 DIAGNOSIS — E11.8 DM (DIABETES MELLITUS), TYPE 2 WITH COMPLICATIONS (HCC): ICD-10-CM

## 2024-09-20 DIAGNOSIS — E11.69 HYPERLIPIDEMIA ASSOCIATED WITH TYPE 2 DIABETES MELLITUS (HCC): ICD-10-CM

## 2024-09-20 DIAGNOSIS — Z00.00 ANNUAL PHYSICAL EXAM: ICD-10-CM

## 2024-09-20 DIAGNOSIS — E78.5 HYPERLIPIDEMIA ASSOCIATED WITH TYPE 2 DIABETES MELLITUS (HCC): ICD-10-CM

## 2024-09-20 LAB
ALBUMIN SERPL-MCNC: 4.1 G/DL (ref 3.5–5)
ALBUMIN/GLOB SERPL: 1.4 (ref 1.1–2.2)
ALP SERPL-CCNC: 113 U/L (ref 45–117)
ALT SERPL-CCNC: 42 U/L (ref 12–78)
ANION GAP SERPL CALC-SCNC: 6 MMOL/L (ref 2–12)
AST SERPL-CCNC: 28 U/L (ref 15–37)
BASOPHILS # BLD: 0.1 K/UL (ref 0–0.1)
BASOPHILS NFR BLD: 1 % (ref 0–1)
BILIRUB SERPL-MCNC: 0.3 MG/DL (ref 0.2–1)
BUN SERPL-MCNC: 14 MG/DL (ref 6–20)
BUN/CREAT SERPL: 19 (ref 12–20)
CALCIUM SERPL-MCNC: 9.4 MG/DL (ref 8.5–10.1)
CHLORIDE SERPL-SCNC: 106 MMOL/L (ref 97–108)
CHOLEST SERPL-MCNC: 130 MG/DL
CO2 SERPL-SCNC: 29 MMOL/L (ref 21–32)
CREAT SERPL-MCNC: 0.75 MG/DL (ref 0.55–1.02)
CREAT UR-MCNC: 268 MG/DL
DIFFERENTIAL METHOD BLD: ABNORMAL
EOSINOPHIL # BLD: 0.1 K/UL (ref 0–0.4)
EOSINOPHIL NFR BLD: 2 % (ref 0–7)
ERYTHROCYTE [DISTWIDTH] IN BLOOD BY AUTOMATED COUNT: 13 % (ref 11.5–14.5)
EST. AVERAGE GLUCOSE BLD GHB EST-MCNC: 249 MG/DL
GLOBULIN SER CALC-MCNC: 3 G/DL (ref 2–4)
GLUCOSE SERPL-MCNC: 188 MG/DL (ref 65–100)
HBA1C MFR BLD: 10.3 % (ref 4–5.6)
HCT VFR BLD AUTO: 41.5 % (ref 35–47)
HDLC SERPL-MCNC: 53 MG/DL
HDLC SERPL: 2.5 (ref 0–5)
HGB BLD-MCNC: 13.4 G/DL (ref 11.5–16)
HIV 1+2 AB+HIV1 P24 AG SERPL QL IA: NONREACTIVE
HIV 1/2 RESULT COMMENT: NORMAL
IMM GRANULOCYTES # BLD AUTO: 0 K/UL (ref 0–0.04)
IMM GRANULOCYTES NFR BLD AUTO: 1 % (ref 0–0.5)
LDLC SERPL CALC-MCNC: 52.4 MG/DL (ref 0–100)
LYMPHOCYTES # BLD: 2.2 K/UL (ref 0.8–3.5)
LYMPHOCYTES NFR BLD: 30 % (ref 12–49)
MCH RBC QN AUTO: 27.1 PG (ref 26–34)
MCHC RBC AUTO-ENTMCNC: 32.3 G/DL (ref 30–36.5)
MCV RBC AUTO: 84 FL (ref 80–99)
MICROALBUMIN UR-MCNC: 2.1 MG/DL
MICROALBUMIN/CREAT UR-RTO: 8 MG/G (ref 0–30)
MONOCYTES # BLD: 0.3 K/UL (ref 0–1)
MONOCYTES NFR BLD: 5 % (ref 5–13)
NEUTS SEG # BLD: 4.5 K/UL (ref 1.8–8)
NEUTS SEG NFR BLD: 61 % (ref 32–75)
NRBC # BLD: 0 K/UL (ref 0–0.01)
NRBC BLD-RTO: 0 PER 100 WBC
PLATELET # BLD AUTO: 238 K/UL (ref 150–400)
PMV BLD AUTO: 12 FL (ref 8.9–12.9)
POTASSIUM SERPL-SCNC: 4.5 MMOL/L (ref 3.5–5.1)
PROT SERPL-MCNC: 7.1 G/DL (ref 6.4–8.2)
RBC # BLD AUTO: 4.94 M/UL (ref 3.8–5.2)
SODIUM SERPL-SCNC: 141 MMOL/L (ref 136–145)
SPECIMEN HOLD: NORMAL
TRIGL SERPL-MCNC: 123 MG/DL
TSH SERPL DL<=0.05 MIU/L-ACNC: 0.96 UIU/ML (ref 0.36–3.74)
VLDLC SERPL CALC-MCNC: 24.6 MG/DL
WBC # BLD AUTO: 7.3 K/UL (ref 3.6–11)

## 2024-09-21 LAB — VZV IGG SER IA-ACNC: 452 INDEX

## 2024-09-22 RX ORDER — GLIPIZIDE 5 MG/1
5 TABLET ORAL 2 TIMES DAILY WITH MEALS
Qty: 180 TABLET | Refills: 3 | Status: SHIPPED | OUTPATIENT
Start: 2024-09-22 | End: 2024-09-27 | Stop reason: SDUPTHER

## 2024-09-25 ENCOUNTER — CLINICAL DOCUMENTATION (OUTPATIENT)
Dept: PHARMACY | Facility: CLINIC | Age: 54
End: 2024-09-25

## 2024-09-26 ENCOUNTER — TELEPHONE (OUTPATIENT)
Age: 54
End: 2024-09-26

## 2024-09-27 RX ORDER — GLIPIZIDE 5 MG/1
5 TABLET ORAL 2 TIMES DAILY WITH MEALS
Qty: 180 TABLET | Refills: 3 | Status: SHIPPED | OUTPATIENT
Start: 2024-09-27

## 2024-10-28 DIAGNOSIS — I10 PRIMARY HYPERTENSION: Primary | ICD-10-CM

## 2024-10-28 RX ORDER — METOPROLOL SUCCINATE 25 MG/1
25 TABLET, EXTENDED RELEASE ORAL DAILY
Qty: 90 TABLET | Refills: 3 | Status: SHIPPED | OUTPATIENT
Start: 2024-10-28

## 2024-10-28 NOTE — TELEPHONE ENCOUNTER
PCP: Collins Canseco III, DO    Last appt: 9/13/2024  No future appointments.    Requested Prescriptions     Pending Prescriptions Disp Refills    metoprolol succinate (TOPROL XL) 25 MG extended release tablet 90 tablet 3     Sig: Take 1 tablet by mouth daily

## 2024-10-28 NOTE — TELEPHONE ENCOUNTER
Caller requests Refill of:  metoprolol succinate (TOPROL XL) 25 MG extended release tablet       Please send to:  [id number] [place] - [address] - P [phone] - F [phone]       Visit / Appointment History:  Future Appointment at [facility]:  Visit date not found   Last Appointment With PCP:  9/13/2024       Caller confirmed instructions and dosages as correct.    Caller was advised that Meds will be refilled as soon as possible, however there can be a 48-72 business hour turn around on refill requests.

## 2025-01-03 RX ORDER — METFORMIN HYDROCHLORIDE 500 MG/1
1000 TABLET, EXTENDED RELEASE ORAL 2 TIMES DAILY
Qty: 360 TABLET | Refills: 3 | Status: SHIPPED | OUTPATIENT
Start: 2025-01-03

## 2025-01-03 RX ORDER — SERTRALINE HYDROCHLORIDE 100 MG/1
100 TABLET, FILM COATED ORAL DAILY
Qty: 90 TABLET | Refills: 3 | Status: SHIPPED | OUTPATIENT
Start: 2025-01-03

## 2025-01-20 DIAGNOSIS — E11.8 DM (DIABETES MELLITUS), TYPE 2 WITH COMPLICATIONS (HCC): ICD-10-CM

## 2025-01-20 RX ORDER — INSULIN DEGLUDEC 100 U/ML
INJECTION, SOLUTION SUBCUTANEOUS
Qty: 15 ML | Refills: 2 | Status: SHIPPED | OUTPATIENT
Start: 2025-01-20

## 2025-06-18 DIAGNOSIS — E11.8 DM (DIABETES MELLITUS), TYPE 2 WITH COMPLICATIONS (HCC): ICD-10-CM

## 2025-06-18 RX ORDER — INSULIN DEGLUDEC 100 U/ML
INJECTION, SOLUTION SUBCUTANEOUS
Qty: 15 ML | Refills: 2 | Status: SHIPPED | OUTPATIENT
Start: 2025-06-18